# Patient Record
Sex: FEMALE | Employment: OTHER | URBAN - METROPOLITAN AREA
[De-identification: names, ages, dates, MRNs, and addresses within clinical notes are randomized per-mention and may not be internally consistent; named-entity substitution may affect disease eponyms.]

---

## 2019-07-10 NOTE — TELEPHONE ENCOUNTER
RECORDS RECEIVED FROM: External - Dr. Tolu Kuo at [facility]    DATE RECEIVED: 7/23/19   NOTES (FOR ALL VISITS) STATUS DETAILS   OFFICE NOTE from referring provider Care Everywhere 6/18/19   OFFICE NOTE from other specialist N/A    DISCHARGE SUMMARY from hospital N/A    DISCHARGE REPORT from the ER N/A    OPERATIVE REPORT N/A    MEDICATION LIST Care Everywhere    IMAGING  (FOR ALL VISITS)     EMG N/A    EEG N/A    ECT N/A    MRI (HEAD, NECK, SPINE) Received  [facility]:  MRI Brain 10/4/11   LUMBAR PUNCTURE N/A    NATAL[facility]A Scan N/A    CT (HEAD, NECK, SPINE) N/A       Action    Action Taken Imaging request faxed to [facility]

## 2019-07-16 NOTE — TELEPHONE ENCOUNTER
Imaging Received  [facility]   Image Type (x): Disc:   PACS: x   Exam Date/Name MRI Brain 10/4/11 Comments: Images resolved in PACS

## 2019-07-22 ASSESSMENT — ENCOUNTER SYMPTOMS
COUGH: 0
SPEECH CHANGE: 0
MUSCLE CRAMPS: 1
BACK PAIN: 0
BREAST PAIN: 0
NERVOUS/ANXIOUS: 1
STIFFNESS: 1
HYPERTENSION: 1
DYSPNEA ON EXERTION: 0
SEIZURES: 0
HYPOTENSION: 0
MEMORY LOSS: 1
POSTURAL DYSPNEA: 0
DEPRESSION: 1
BREAST MASS: 0
TINGLING: 1
NUMBNESS: 1
SHORTNESS OF BREATH: 0
DECREASED CONCENTRATION: 1
HEADACHES: 1
SPUTUM PRODUCTION: 0
MUSCLE WEAKNESS: 1
WHEEZING: 0
INSOMNIA: 1
SLEEP DISTURBANCES DUE TO BREATHING: 0
COUGH DISTURBING SLEEP: 0
MYALGIAS: 1
ORTHOPNEA: 0
HEMOPTYSIS: 0
EXERCISE INTOLERANCE: 0
LOSS OF CONSCIOUSNESS: 0
LEG PAIN: 1
DISTURBANCES IN COORDINATION: 1
PANIC: 0
SNORES LOUDLY: 1
ARTHRALGIAS: 1
LIGHT-HEADEDNESS: 0
WEAKNESS: 1
TREMORS: 1
PALPITATIONS: 0
SYNCOPE: 0
PARALYSIS: 0
JOINT SWELLING: 1
DIZZINESS: 1

## 2019-07-23 ENCOUNTER — OFFICE VISIT (OUTPATIENT)
Dept: NEUROLOGY | Facility: CLINIC | Age: 74
End: 2019-07-23
Payer: MEDICARE

## 2019-07-23 ENCOUNTER — PRE VISIT (OUTPATIENT)
Dept: NEUROLOGY | Facility: CLINIC | Age: 74
End: 2019-07-23

## 2019-07-23 VITALS — DIASTOLIC BLOOD PRESSURE: 72 MMHG | OXYGEN SATURATION: 100 % | SYSTOLIC BLOOD PRESSURE: 149 MMHG | HEART RATE: 82 BPM

## 2019-07-23 DIAGNOSIS — G20.C PARKINSONISM, UNSPECIFIED PARKINSONISM TYPE (H): Primary | ICD-10-CM

## 2019-07-23 RX ORDER — LOPERAMIDE HYDROCHLORIDE 2 MG/1
2 TABLET ORAL PRN
COMMUNITY
End: 2020-05-22

## 2019-07-23 RX ORDER — HYDROCHLOROTHIAZIDE 25 MG/1
TABLET ORAL
COMMUNITY
Start: 2018-07-20 | End: 2020-05-22

## 2019-07-23 RX ORDER — BUDESONIDE 3 MG/1
CAPSULE, COATED PELLETS ORAL
COMMUNITY
Start: 2018-12-28 | End: 2020-05-22

## 2019-07-23 RX ORDER — LETROZOLE 2.5 MG/1
TABLET, FILM COATED ORAL
COMMUNITY
Start: 2018-10-01 | End: 2020-05-22

## 2019-07-23 RX ORDER — CHOLECALCIFEROL (VITAMIN D3) 25 MCG
CAPSULE ORAL
COMMUNITY
End: 2020-05-22

## 2019-07-23 RX ORDER — LISINOPRIL 40 MG/1
TABLET ORAL
COMMUNITY
Start: 2018-07-20 | End: 2020-05-22

## 2019-07-23 RX ORDER — ARIPIPRAZOLE 20 MG/1
20 TABLET ORAL DAILY
COMMUNITY
Start: 2018-07-20 | End: 2020-05-22

## 2019-07-23 RX ORDER — BISMUTH SUBSALICYLATE 262 MG/1
2 TABLET, CHEWABLE ORAL
COMMUNITY
Start: 2013-07-09 | End: 2020-05-22

## 2019-07-23 SDOH — HEALTH STABILITY: MENTAL HEALTH: HOW OFTEN DO YOU HAVE A DRINK CONTAINING ALCOHOL?: 4 OR MORE TIMES A WEEK

## 2019-07-23 SDOH — HEALTH STABILITY: MENTAL HEALTH: HOW MANY STANDARD DRINKS CONTAINING ALCOHOL DO YOU HAVE ON A TYPICAL DAY?: 1 OR 2

## 2019-07-23 ASSESSMENT — PAIN SCALES - GENERAL: PAINLEVEL: NO PAIN (0)

## 2019-07-23 ASSESSMENT — UNIFIED PARKINSONS DISEASE RATING SCALE (UPDRS)
POSTURAL_STABILITY: SEVERE: VERY UNSTABLE, TENDS TO LOSE BALANCE SPONTANEOUSLY OR WITH JUST A GENTLE PULL ON THE SHOULDERS.
AMPLITUDE_LIP_JAW: NORMAL: NO TREMOR.
SPEECH: SLIGHT: LOSS OF MODULATION, DICTION OR VOLUME, BUT STILL ALL WORDS EASY TO UNDERSTAND.
RIGIDITY_LUE: SLIGHT: RIGIDITY ONLY DETECTED WITH ACTIVATION MANEUVER.
TOTAL_SCORE: 3
LEG_AGILITY_RIGHT: NORMAL
TOETAPPING_RIGHT: NORMAL
FINGER_TAPPING_LEFT: SLIGHT: ANY OF THE FOLLOWING: A) THE REGULAR RHYTHM IS BROKEN WITH ONE WITH ONE OR TWO INTERRUPTIONS OR HESITATIONS OF THE MOVEMENT B) SLIGHT SLOWING C) THE AMPLITUDE DECREMENTS NEAR THE END OF THE 10 MOVEMENTS.
HANDMOVEMENTS_LEFT: SLIGHT: ANY OF THE FOLLOWING: A) THE REGULAR RHYTHM IS BROKEN WITH ONE WITH ONE OR TWO INTERRUPTIONS OR HESITATIONS OF THE MOVEMENT B) SLIGHT SLOWING C) THE AMPLITUDE DECREMENTS NEAR THE END OF THE 10 MOVEMENTS.
GAIT: MILD: INDEPENDENT WALKING BUT WITH SUBSTANTIAL GAIT IMPAIRMENT.
AMPLITUDE_RUE: NORMAL: NO TREMOR.
HANDMOVEMENTS_RIGHT: SLIGHT: ANY OF THE FOLLOWING: A) THE REGULAR RHYTHM IS BROKEN WITH ONE WITH ONE OR TWO INTERRUPTIONS OR HESITATIONS OF THE MOVEMENT B) SLIGHT SLOWING C) THE AMPLITUDE DECREMENTS NEAR THE END OF THE 10 MOVEMENTS.
RIGIDITY_RLE: NORMAL
PRONATION_SUPINATION_LEFT: NORMAL
TOETAPPING_LEFT: NORMAL
RIGIDITY_NECK: NORMAL
ARISING_CHAIR: MILD:  PUSHES SELF UP FROM ARMS OF CHAIR WITHOUT DIFFICULTY.
SPONTANEITY_OF_MOVEMENT: 0: NORMAL.  NO PROBLEMS.
LEG_AGILITY_LEFT: NORMAL
AXIAL_SCORE: 13
FREEZING_GAIT: NORMAL
AMPLITUDE_LLE: NORMAL: NO TREMOR.
TOTAL_SCORE: 20
TOTAL_SCORE_LEFT: 4
RIGIDITY_RUE: NORMAL
PARKINSONS_MEDS: OFF
AMPLITUDE_RLE: NORMAL: NO TREMOR.
RIGIDITY_LLE: NORMAL
AMPLITUDE_LUE: NORMAL: NO TREMOR.
POSTURE: 2 MILD.  DEFINITE FLEXION, SCOLIOSIS OR LEANING OT ONE SIDE, BUT CAN CORRECT POSTURE TO NORMAL WHEN ASKED.
FACIAL_EXPRESSION: MILD: IN ADDITION TO DECREASED EYE-BLINK FREQUENCY, MASKED FACIES PRESENT IN THE LOWER FACE AS WELL, NAMELY FEWER MOVEMENTS AROUND THE MOUTH, SUCH AS LESS SPONTANEOUS SMILING, BUT LIPS NOT PARTED.
PRONATION_SUPINATION_RIGHT: NORMAL
FINGER_TAPPING_RIGHT: SLIGHT: ANY OF THE FOLLOWING: A) THE REGULAR RHYTHM IS BROKEN WITH ONE WITH ONE OR TWO INTERRUPTIONS OR HESITATIONS OF THE MOVEMENT B) SLIGHT SLOWING C) THE AMPLITUDE DECREMENTS NEAR THE END OF THE 10 MOVEMENTS.
CONSTANCY_TREMOR_ATREST: NORMAL: NO TREMOR.

## 2019-07-23 NOTE — PATIENT INSTRUCTIONS
1.  You have Parkinsonism.  It could be Parkinson's disease or drug-induced Parkinsonism.  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  2.  I would recommend you taper off Abilify under your doctor or a psychiatrists observation.  3.  Please come back and see me when you return to [place].

## 2019-07-23 NOTE — LETTER
2019       RE: Torri Cuevas  [address]  [facility] [address]     Dear Colleague,    Thank you for referring your patient, Torri Cuevas, to the [facility] NEUROLOGY at [facility]. Please see a copy of my visit note below.    Department of Neurology  Movement Disorders Division   Initial Clinic Evaluation     Patient: Torri Cuevas   MRN: [id number]   : 1945   Date of Visit: 2019     Referring Physician: Ayaan    Reason for Referral: ? Parkinson's disease    Impression:  1.  Parkinsonism.  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  I agree with Dr. Don that the patient's tremor sounds more like essential tremor than parkinsonian tremor.  A DaTscan could be done but current studies indicate only 90% accuracy at best.  I think it makes more sense to taper off Abilify under her family doctor or psychiatrist care.  Most patients improve within 3 months although the rare patient will continue to have symptoms for 6 to 12 months.    Recommendations:  1.  Patient and her  feel that they will contact the psychiatrist in [place] and taper Abilify under surveillance.  2.  They will keep me apprised of her progress through [facility].  3.  She will return in the spring when they are coming back from [place] for reexamination.  If she continues to have evidence of parkinsonism on exam at that time I think a DaTscan or trial of levodopa would make sense.    Thanks to Dr. Kuo for involving me in this patient's care.      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  She raised her family in [place].  She recently retired to [employer] and [employer] in [place].  She says her  has noted for at least 2 years that she shakes in the hands and shuffles when she walks.  Her 's father had  Parkinson's disease and he thinks she walks like his father.  He feels she is slowing down.  Recently the patient's friends who have Parkinson's disease or friends with Parkinson's disease encouraged her to see a doctor because of her reduced arm swing.    The patient feels she has had some tremor for 10 years.  This is been in her hands.  It was an action tremor.  She noticed it when she would pick things up.  She notices it when she progresses with yoga.  It does not affect her eating drinking or handwriting.  There is no family history of tremor.  She has never developed resting tremor.    She has been on Abilify for at least 10 years.  She is taking 20 mg a day.  She says it was given to her for depression.  She says it never worked very well.  The person who gave it to her retired.  She simply continued on it.    For the past year her arms do not swing when she walks.  She shuffles and walks slowly.  She is weaker than she was in the past.  There is no family history of Parkinson's disease or tremor.  She fell 2 years ago and hit her head.  There was no period of unconsciousness.  She was seen in the emergency room and released.  She has no history of toxic exposures.    She has not lost her sense of smell.  She has no REM behavior disorder.  She has no restless leg syndrome.  Drooling has been increased.  There is no micrographia.  Swallowing and speech are normal.  She has a little bit of difficulty getting out of a chair and turning in bed.  She has no difficulty dressing.    She has had no falls or freezing.  She has no festination.  She has a bit of bladder frequency without incontinence.  She has had one episode of hallucination where she thought the leak was on fire.  She has no other form hallucination of people or animals.  Her memory is worse than it was in the past but her  said his is not out of the ordinary for her age.    The patient saw Dr. Kuo on June 18, 2019.  I read through his  excellent consultation.  He found evidence of parkinsonism with cogwheel rigidity in the left biceps reduced stride length, reduced arm swing, and retropulsion.  He wondered about doing a DaTscan.  He recognizes that this could be drug-induced parkinsonism due to neuroleptic therapy.    Past Medical History:   No past medical history on file.    Past Surgical History:   No past surgical history on file.    Medications:  Current Outpatient Medications   Medication Sig Dispense Refill     ARIPiprazole (ABILIFY) 20 MG tablet Take 20 mg by mouth daily       bismuth subsalicylate (PEPTO-BISMOL) 262 MG chewable tablet 2 tablets       budesonide (ENTOCORT EC) 3 MG EC capsule Start at 3 tabs daily x 6 weeks then 2 tabs x x 2 weeks then 1 tab x 2 weeks       calcium carbonate-vitamin D (OSCAL 500/200 D-3) 500-200 MG-UNIT tablet Take 1 tablet by mouth 2 times daily       Cholecalciferol (VITAMIN D-3) 1000 units CAPS        FLUoxetine (PROZAC) 20 MG capsule TAKE 2 CAPSULES ONCE DAILY       hydrochlorothiazide (HYDRODIURIL) 25 MG tablet TAKE 1 TABLET DAILY       letrozole (FEMARA) 2.5 MG tablet TAKE 1 TABLET DAILY       lisinopril (PRINIVIL/ZESTRIL) 40 MG tablet TAKE 1 TABLET DAILY       loperamide (IMODIUM A-D) 2 MG tablet Take 2 mg by mouth as needed for diarrhea       Multiple Vitamins-Minerals (MULTIVITAMIN PO) Take 1 tablet by mouth daily       Zoledronic Acid (ZOMETA IV)             Movement Disorder-related Medications                                                                                                                                                             Allergies: is allergic to lactose.    Social History:   Social History     Socioeconomic History     Marital status:      Spouse name: None     Number of children: None     Years of education: None     Highest education level: None   Occupational History     None   Social Needs     Financial resource strain: None     Food insecurity:     Worry:  None     Inability: None     Transportation needs:     Medical: None     Non-medical: None   Tobacco Use     Smoking status: Never Smoker     Smokeless tobacco: Never Used   Substance and Sexual Activity     Alcohol use: Yes     Frequency: 4 or more times a week     Drinks per session: 1 or 2     Drug use: None     Sexual activity: None   Lifestyle     Physical activity:     Days per week: None     Minutes per session: None     Stress: None   Relationships     Social connections:     Talks on phone: None     Gets together: None     Attends Religion service: None     Active member of club or organization: None     Attends meetings of clubs or organizations: None     Relationship status: None     Intimate partner violence:     Fear of current or ex partner: None     Emotionally abused: None     Physically abused: None     Forced sexual activity: None   Other Topics Concern     None   Social History Narrative     None       Family History:  No family history on file.    ROS:      Neurologic  Visual loss: no, Double vision: YES, Headache: YES, Loss of consciousness:  no, Seizure: no, Fainting (syncope): no, Dysarthria: no, Dysphagia:  no, Vertigo:  no, Weakness: no, Atrophy: no, Twitches: no, Lhermitte's: no, Numbness or tingling: YES, Handwriting change: no, Tremors: YES, Involuntary movements: no, Imbalance: no, Abnormal gait:  no, Falls :no, Memory loss: YES, RBD: no, Sleep disorder: no, Hallucination: no, Loss of concentration: no,  Behavioral change: no,  Loss of motivation: no      Comprehensive Neurologic Exam    Mental Status Exam   The patient is alert, oriented and exhibits no difficulty with cognition or memory.  A formal short test of mental status was performed.     Neurovascular         Speech and Language   Right Left     Carotid Bruit Absent Absent  Speech is normal.   Dorsalis Pedis Pulse Normal Normal  Description   Posterior Tibial Pulse Normal Normal  Language is normal.     Cranial Nerve Exam                  Right Left   Right Left   II                                        Normal Normal  Hearing (VIII) Normal Normal      III, IV, V!                   Normal Normal  Nystagmus (VIII) Normal Normal   EOM description                              Gag (IX, X) Normal Normal   Facial Sensation (V) Normal Normal  SCM (XI) Normal Normal   Muscles of Mastication (V) Normal Normal  Trapezius (XI) Normal Normal   Facial Strength (VII) Normal Normal  Tongue (XII) Normal Normal     Motor Exam  Strength (*/5 grading)  Muscle                  Right Left  Muscle Right Left   Frontalis                                           Normal Normal  Iliopsoas Normal    Normal          Orbicularis Oculi                     Normal Normal  Quadrideps Normal    Normal        Orbicularis Oris                         Normal Normal  Anterior Tibial Normal Normal      Deltoid Normal Normal  Gastrocnemius Normal    Normal   Biceps Normal Normal  Extensor Hallucis Longus Normal    Normal   Triceps Normal Normal  Toe Extensors Normal    Normal   EDC Normal Normal  Toe Flexor Normal    Normal   Finger Flexors Normal Normal  Other Normal Normal   First Dorsal Interosseous Normal Normal  Other Normal Normal   Hypothenar Normal Normal  Other Normal Normal   Thenar Normal Normal  Other Normal Normal     Reflexes      Tone   Right Left   Right Left   Biceps Normal Normal  Spasticity (S)  Rigidity (R)     Triceps Normal Normal  Neck     Brachioradialis Normal Normal  Arm     Quadriceps Normal Normal  Leg     Ankle Normal Normal       Babkinski Flexor Flexor         UPDRS Values 7/23/2019   Time: 1:00 PM   Medication Off   R Brain DBS: None   L Brain DBS: None   Speech 1   Facial Expression 2   Rigidity Neck 0   Rigidity RUE 0   Rigidity LUE 1   Rigidity RLE 0   Rigidity LLE 0   Finger Taps R 1   Finger Taps L 1   Hand Mvt R 1   Hand Mvt L 1   Pron-/Supinate R 0   Pron-/Supinate L 0   Toe Tap R 0   Toe Tap L 0   Leg Agility R 0   Leg Agility L 0   Arise  From Chair 2   Gait 2   Gait Freezing 0   Postural Stability 4   Posture 2   Global Spont Mvt 0   Postural Tremor RUE 1   Postural Tremor LUE 1   Kinetic Tremor RUE 0   Kinetic Tremor LUE 0   Rest Tremor RUE 0   Rest Tremor LUE 0   Rest Tremor RLE 0   Rest Tremor LLE 0   Rest Tremor Lip/Jaw 0   Rest Tremor Constancy 0   Total Right 3   Total Left 4   Axial Total 13   Total 20       Sensory Exam          Right Left    Pin Normal Normal    Vibration Normal Normal    Joint position Normal Normal    Other           Coding statement:     Medical decision making is high due to the following components:    Number of diagnoses:[id number]  Management:Diagnostic tests orders or reviewed.Yes  Medications were prescribed.No Medications were adjusted.No  Complexity of medical data:Outside records reviewed.Yes Radiology images reviewed by myself.No Review/order clinical lab tests. No Review/order radiologyNo Discussed tests with performing physician. No Called outside records.Yes Discussed patient with another provider.No Took collateral history.YesRiskOne or more chronic illnesses with severe exacerbation, progression, or side effects of treatment.YesAcute or chronic illness or injury that poses a threat to life or bodily function.No  Abrupt change in neurologic status.No    Again, thank you for allowing me to participate in the care of your patient.      Sincerely,    Brian Layton MD

## 2019-07-23 NOTE — PROGRESS NOTES
Department of Neurology  Movement Disorders Division   Initial Clinic Evaluation     Patient: Torri Cuevas   MRN: [id number]   : 1945   Date of Visit: 2019     Referring Physician: Ayaan    Reason for Referral: ? Parkinson's disease    Impression:  1.  Parkinsonism.  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  I agree with Dr. Don that the patient's tremor sounds more like essential tremor than parkinsonian tremor.  A DaTscan could be done but current studies indicate only 90% accuracy at best.  I think it makes more sense to taper off Abilify under her family doctor or psychiatrist care.  Most patients improve within 3 months although the rare patient will continue to have symptoms for 6 to 12 months.    Recommendations:  1.  Patient and her  feel that they will contact the psychiatrist in [place] and taper Abilify under surveillance.  2.  They will keep me apprised of her progress through [facility].  3.  She will return in the spring when they are coming back from [place] for reexamination.  If she continues to have evidence of parkinsonism on exam at that time I think a DaTscan or trial of levodopa would make sense.    Thanks to Dr. Kuo for involving me in this patient's care.      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  She raised her family in [place].  She recently retired to [employer] and [employer] in [place].  She says her  has noted for at least 2 years that she shakes in the hands and shuffles when she walks.  Her 's father had Parkinson's disease and he thinks she walks like his father.  He feels she is slowing down.  Recently the patient's friends who have Parkinson's disease or friends with Parkinson's  disease encouraged her to see a doctor because of her reduced arm swing.    The patient feels she has had some tremor for 10 years.  This is been in her hands.  It was an action tremor.  She noticed it when she would pick things up.  She notices it when she progresses with yoga.  It does not affect her eating drinking or handwriting.  There is no family history of tremor.  She has never developed resting tremor.    She has been on Abilify for at least 10 years.  She is taking 20 mg a day.  She says it was given to her for depression.  She says it never worked very well.  The person who gave it to her retired.  She simply continued on it.    For the past year her arms do not swing when she walks.  She shuffles and walks slowly.  She is weaker than she was in the past.  There is no family history of Parkinson's disease or tremor.  She fell 2 years ago and hit her head.  There was no period of unconsciousness.  She was seen in the emergency room and released.  She has no history of toxic exposures.    She has not lost her sense of smell.  She has no REM behavior disorder.  She has no restless leg syndrome.  Drooling has been increased.  There is no micrographia.  Swallowing and speech are normal.  She has a little bit of difficulty getting out of a chair and turning in bed.  She has no difficulty dressing.    She has had no falls or freezing.  She has no festination.  She has a bit of bladder frequency without incontinence.  She has had one episode of hallucination where she thought the leak was on fire.  She has no other form hallucination of people or animals.  Her memory is worse than it was in the past but her  said his is not out of the ordinary for her age.    The patient saw Dr. Kuo on June 18, 2019.  I read through his excellent consultation.  He found evidence of parkinsonism with cogwheel rigidity in the left biceps reduced stride length, reduced arm swing, and retropulsion.  He wondered about  doing a DaTscan.  He recognizes that this could be drug-induced parkinsonism due to neuroleptic therapy.        Past Medical History:   No past medical history on file.    Past Surgical History:   No past surgical history on file.    Medications:  Current Outpatient Medications   Medication Sig Dispense Refill     ARIPiprazole (ABILIFY) 20 MG tablet Take 20 mg by mouth daily       bismuth subsalicylate (PEPTO-BISMOL) 262 MG chewable tablet 2 tablets       budesonide (ENTOCORT EC) 3 MG EC capsule Start at 3 tabs daily x 6 weeks then 2 tabs x x 2 weeks then 1 tab x 2 weeks       calcium carbonate-vitamin D (OSCAL 500/200 D-3) 500-200 MG-UNIT tablet Take 1 tablet by mouth 2 times daily       Cholecalciferol (VITAMIN D-3) 1000 units CAPS        FLUoxetine (PROZAC) 20 MG capsule TAKE 2 CAPSULES ONCE DAILY       hydrochlorothiazide (HYDRODIURIL) 25 MG tablet TAKE 1 TABLET DAILY       letrozole (FEMARA) 2.5 MG tablet TAKE 1 TABLET DAILY       lisinopril (PRINIVIL/ZESTRIL) 40 MG tablet TAKE 1 TABLET DAILY       loperamide (IMODIUM A-D) 2 MG tablet Take 2 mg by mouth as needed for diarrhea       Multiple Vitamins-Minerals (MULTIVITAMIN PO) Take 1 tablet by mouth daily       Zoledronic Acid (ZOMETA IV)             Movement Disorder-related Medications                                                                                                                                                             Allergies: is allergic to lactose.    Social History:   Social History     Socioeconomic History     Marital status:      Spouse name: None     Number of children: None     Years of education: None     Highest education level: None   Occupational History     None   Social Needs     Financial resource strain: None     Food insecurity:     Worry: None     Inability: None     Transportation needs:     Medical: None     Non-medical: None   Tobacco Use     Smoking status: Never Smoker     Smokeless tobacco: Never Used    Substance and Sexual Activity     Alcohol use: Yes     Frequency: 4 or more times a week     Drinks per session: 1 or 2     Drug use: None     Sexual activity: None   Lifestyle     Physical activity:     Days per week: None     Minutes per session: None     Stress: None   Relationships     Social connections:     Talks on phone: None     Gets together: None     Attends [employer] service: None     Active member of club or organization: None     Attends meetings of clubs or organizations: None     Relationship status: None     Intimate partner violence:     Fear of current or ex partner: None     Emotionally abused: None     Physically abused: None     Forced sexual activity: None   Other Topics Concern     None   Social History Narrative     None       Family History:  No family history on file.    ROS:      Neurologic  Visual loss: no, Double vision: YES, Headache: YES, Loss of consciousness:  no, Seizure: no, Fainting (syncope): no, Dysarthria: no, Dysphagia:  no, Vertigo:  no, Weakness: no, Atrophy: no, Twitches: no, Lhermitte's: no, Numbness or tingling: YES, Handwriting change: no, Tremors: YES, Involuntary movements: no, Imbalance: no, Abnormal gait:  no, Falls :no, Memory loss: YES, RBD: no, Sleep disorder: no, Hallucination: no, Loss of concentration: no,  Behavioral change: no,  Loss of motivation: no      Comprehensive Neurologic Exam    Mental Status Exam   The patient is alert, oriented and exhibits no difficulty with cognition or memory.  A formal short test of mental status was performed.     Neurovascular         Speech and Language   Right Left     Carotid Bruit Absent Absent  Speech is normal.   Dorsalis Pedis Pulse Normal Normal  Description   Posterior Tibial Pulse Normal Normal  Language is normal.     Cranial Nerve Exam                 Right Left   Right Left   II                                        Normal Normal  Hearing (VIII) Normal Normal      III, IV, V!                   Normal Normal   Nystagmus (VIII) Normal Normal   EOM description                              Gag (IX, X) Normal Normal   Facial Sensation (V) Normal Normal  SCM (XI) Normal Normal   Muscles of Mastication (V) Normal Normal  Trapezius (XI) Normal Normal   Facial Strength (VII) Normal Normal  Tongue (XII) Normal Normal     Motor Exam  Strength (*/5 grading)  Muscle                  Right Left  Muscle Right Left   Frontalis                                           Normal Normal  Iliopsoas Normal    Normal          Orbicularis Oculi                     Normal Normal  Quadrideps Normal    Normal        Orbicularis Oris                         Normal Normal  Anterior Tibial Normal Normal      Deltoid Normal Normal  Gastrocnemius Normal    Normal   Biceps Normal Normal  Extensor Hallucis Longus Normal    Normal   Triceps Normal Normal  Toe Extensors Normal    Normal   EDC Normal Normal  Toe Flexor Normal    Normal   Finger Flexors Normal Normal  Other Normal Normal   First Dorsal Interosseous Normal Normal  Other Normal Normal   Hypothenar Normal Normal  Other Normal Normal   Thenar Normal Normal  Other Normal Normal     Reflexes      Tone   Right Left   Right Left   Biceps Normal Normal  Spasticity (S)  Rigidity (R)     Triceps Normal Normal  Neck     Brachioradialis Normal Normal  Arm     Quadriceps Normal Normal  Leg     Ankle Normal Normal       Babkinski Flexor Flexor         UPDRS Values 7/23/2019   Time: 1:00 PM   Medication Off   R Brain DBS: None   L Brain DBS: None   Speech 1   Facial Expression 2   Rigidity Neck 0   Rigidity RUE 0   Rigidity LUE 1   Rigidity RLE 0   Rigidity LLE 0   Finger Taps R 1   Finger Taps L 1   Hand Mvt R 1   Hand Mvt L 1   Pron-/Supinate R 0   Pron-/Supinate L 0   Toe Tap R 0   Toe Tap L 0   Leg Agility R 0   Leg Agility L 0   Arise From Chair 2   Gait 2   Gait Freezing 0   Postural Stability 4   Posture 2   Global Spont Mvt 0   Postural Tremor RUE 1   Postural Tremor LUE 1   Kinetic Tremor RUE 0    Kinetic Tremor LUE 0   Rest Tremor RUE 0   Rest Tremor LUE 0   Rest Tremor RLE 0   Rest Tremor LLE 0   Rest Tremor Lip/Jaw 0   Rest Tremor Constancy 0   Total Right 3   Total Left 4   Axial Total 13   Total 20         Sensory Exam          Right Left    Pin Normal Normal    Vibration Normal Normal    Joint position Normal Normal    Other             Coding statement:     Medical decision making is high due to the following components:    Number of diagnoses:[id number]  Management:Diagnostic tests orders or reviewed.Yes  Medications were prescribed.No Medications were adjusted.No  Complexity of medical data:Outside records reviewed.Yes Radiology images reviewed by myself.No Review/order clinical lab tests. No Review/order radiologyNo Discussed tests with performing physician. No Called outside records.Yes Discussed patient with another provider.No Took collateral history.YesRiskOne or more chronic illnesses with severe exacerbation, progression, or side effects of treatment.YesAcute or chronic illness or injury that poses a threat to life or bodily function.No  Abrupt change in neurologic status.No                     Answers for HPI/ROS submitted by the patient on 7/22/2019   General Symptoms: No  Skin Symptoms: No  HENT Symptoms: No  EYE SYMPTOMS: No  HEART SYMPTOMS: Yes  LUNG SYMPTOMS: Yes  INTESTINAL SYMPTOMS: No  URINARY SYMPTOMS: No  GYNECOLOGIC SYMPTOMS: No  BREAST SYMPTOMS: Yes  SKELETAL SYMPTOMS: Yes  BLOOD SYMPTOMS: No  NERVOUS SYSTEM SYMPTOMS: Yes  MENTAL HEALTH SYMPTOMS: Yes  Cough: No  Sputum or phlegm: No  Coughing up blood: No  Difficulty breating or shortness of breath: No  Snoring: Yes  Wheezing: No  Difficulty breathing on exertion: No  Nighttime Cough: No  Difficulty breathing when lying flat: No  Chest pain or pressure: No  Fast or irregular heartbeat: No  Pain in legs with walking: Yes  Trouble breathing while lying down: No  Fingers or toes appear blue: No  High blood pressure: Yes  Low  blood pressure: No  Fainting: No  Murmurs: No  Pacemaker: No  Varicose veins: No  Edema or swelling: Yes  Wake up at night with shortness of breath: No  Light-headedness: No  Exercise intolerance: No  Back pain: No  Muscle aches: Yes  Swollen joints: Yes  Joint pain: Yes  Bone pain: Yes  Muscle cramps: Yes  Muscle weakness: Yes  Joint stiffness: Yes  Bone fracture: No  Trouble with coordination: Yes  Dizziness or trouble with balance: Yes  Fainting or black-out spells: No  Memory loss: Yes  Headache: Yes  Seizures: No  Speech problems: No  Tingling: Yes  Tremor: Yes  Weakness: Yes  Difficulty walking: Yes  Paralysis: No  Numbness: Yes  Discharge: No  Lumps: No  Pain: No  Nipple retraction: No  Nervous or Anxious: Yes  Depression: Yes  Trouble sleeping: Yes  Trouble thinking or concentrating: Yes  Mood changes: No  Panic attacks: No

## 2020-01-22 ENCOUNTER — TRANSFERRED RECORDS (OUTPATIENT)
Dept: HEALTH INFORMATION MANAGEMENT | Facility: CLINIC | Age: 75
End: 2020-01-22

## 2020-02-04 ENCOUNTER — TRANSFERRED RECORDS (OUTPATIENT)
Dept: HEALTH INFORMATION MANAGEMENT | Facility: CLINIC | Age: 75
End: 2020-02-04

## 2020-03-11 ENCOUNTER — HEALTH MAINTENANCE LETTER (OUTPATIENT)
Age: 75
End: 2020-03-11

## 2020-03-20 ENCOUNTER — HOSPITAL ENCOUNTER (OUTPATIENT)
Dept: HOSPITAL 90 - RAH | Age: 75
Discharge: HOME | End: 2020-03-20
Attending: INTERNAL MEDICINE
Payer: MEDICARE

## 2020-03-20 DIAGNOSIS — R60.0: Primary | ICD-10-CM

## 2020-03-20 PROCEDURE — 93970 EXTREMITY STUDY: CPT

## 2020-05-15 PROBLEM — G21.19 DRUG-INDUCED PARKINSONISM (H): Status: ACTIVE | Noted: 2020-05-15

## 2020-05-15 PROBLEM — Z17.0 MALIGNANT NEOPLASM OF UPPER-OUTER QUADRANT OF RIGHT BREAST IN FEMALE, ESTROGEN RECEPTOR POSITIVE (H): Status: ACTIVE | Noted: 2018-08-22

## 2020-05-15 PROBLEM — C50.411 MALIGNANT NEOPLASM OF UPPER-OUTER QUADRANT OF RIGHT BREAST IN FEMALE, ESTROGEN RECEPTOR POSITIVE (H): Status: ACTIVE | Noted: 2018-08-22

## 2020-05-15 RX ORDER — LISINOPRIL/HYDROCHLOROTHIAZIDE 10-12.5 MG
1 TABLET ORAL
COMMUNITY
Start: 2020-04-11 | End: 2020-05-22

## 2020-05-15 RX ORDER — TAMOXIFEN CITRATE 20 MG/1
20 TABLET ORAL
COMMUNITY
Start: 2019-08-14 | End: 2020-05-22

## 2020-05-15 RX ORDER — POTASSIUM CHLORIDE 750 MG/1
10 TABLET, EXTENDED RELEASE ORAL
COMMUNITY
Start: 2020-05-11 | End: 2020-05-22

## 2020-05-15 RX ORDER — BUDESONIDE 3 MG/1
CAPSULE, COATED PELLETS ORAL
COMMUNITY
Start: 2019-10-22 | End: 2020-07-15

## 2020-05-15 NOTE — PROGRESS NOTES
"      VIDEO VISIT    Date of Visit: May 22, 2020  Name: Torri Cuevas \"Torri\"  Kevin  Date of Birth 1945  [facility] [address]  [phone] (M)  [email or web address]  No proxy    [email or web address]    Kevin Cuevas  Same number    Assessment:  (G20) Parkinsonism, unspecified Parkinsonism type (H)  (primary encounter diagnosis) - presumed medication induced.     She has features of tardive dyskinesias that have present since feb 2020.    Tremors resolved when she tapered off abilify summer 2019 after seeing Dr. Layton 7/2019    Seen by Dr. Layton and Dr. Kuo    Has not seen psychiatry     Dr. Amol [place]  3/2020 visit  Sertraline was started for mood and her mood is better.       08/14/2019 Appointment Oncology Jessica Steven, APRN, CNP    [address], [place] [address] [phone] (Fax)             Medications            Aripiprazole abilify 20mg Off        Bismuth-subsalicylate pepto-bismol 262mtn ?taking       Budesonide entocort ec 3mg  Taper schedule       Calcium citrate vitamin D 315-250 1  1     Lisinopril-hydrochlorothiazide zestoretic 10-12.5mg  1       Loperamide imodium 2mg  4/day as needed       MVI 1       Potassium chloride ER Ktab klorcon 10meq cr 1  1     Sertaline zoloft 100mg   1     Tamoxifen novadex 20mg  Off for 2 weeks       Topiramate topamax 25mg   3     Vitamin d3 2000 international unit(s)  1       Zoledronic acid zometa Every 6 months                                                                                                         Plan:    Psychiatric disorder  Medication induced tremor/parkinsonism better off abilify  Now having features of tardive dyskinesias   Would benefit from ongoing psychiatric care  Consultation with Cara House, Pharm D to review medication options for TD  May need another ECG - had one in [place] and they may be able to send a PDF of it. Otherwise may need to get one done in " [place].    Here are some medication options for TD.     Ingrezza  (Valbenazine)  Ingrezza dosed 40mg daily for 1 week and then can increase to 80mg daily. 1 in 4 patients treated for 6 weeks on 80mg of Ingrezza have severity of symptoms reduced by >50%. Brief review of studies showed this data is from patients with TD due to exposure to neuroleptics. Side effects: somnolence, increased QT interval. Patient is not on any other medications that would contribute to prolonged QT interval. This is a specialty medication and likely would have to be filled through a specialty pharmacy. Cost ~$5000-10,000/month. [facility] could help us navigate coverage for the patient.      Austedo  (deutetrabenazine) dosed 6mg daily and can be increased in 6mg increments up to 48mg/day. 50% of patients reported improvement in their symptoms and 40% of providers observed improvement in their patients. This data is from patients with chorea in Posey's Disease. There is an increased risk of suicidal idea and depression with this medication but patient denies any mood disorder. Side effects include sleepiness, diarrhea, tiredness, dry mouth, prolonged QT interval. This medication costs ~$4000 for #60 of the 6mg capsules.    The starting dose is 6 mg once daily. Titrate up at weekly intervals by 6 mg per day to a tolerated dose that reduces chorea, up to a maximum recommended daily dosage of 48 mg (24 mg twice daily)  Administer with food    6mg/d x 1wk; 6mg 2/day x 1wk; 6mg in am and 12mg in pm x 1wk; then 12mg 2/day then 12mg in am and 18mg in pm x 1wk then 18mg 2/day x 1wk then 24mg in am and 18mg in pm x 1 wk then 24mg 2/day      Tardive movement problems    [email or web address]/home/GetGuidelineContent/613  [email or web address]://[email or web address]/Guidelines/home/GetGuidelineContent/614    Gingko 80mg 3 times per day. Monitor for affects on anticoagulation and antidepressants    Clonazepam  "option          AIMS    [email or web address]_aims.pdf    Facial and Oral movements.   1. Muscle of facial expression   1  2. Lips and perioral area 2  3. Jaw 2  4. Tongue 2    Extremity movements  5. Upper 1  6. Lower 1    Trunk  Neck/shoulder/hips 0    Global judgments  8. Severity of movements 2  9. Incapacitation due to abnormal movements 1  10. Patient awareness of abnormal movements 2    Dental Status  11. Current problems with teeth and/or dentures  0=no;   12. Does patient usually wear dentures  0=no  13. Not edentia = 0   14. Movements are not present at night = 0     AIMS Score 14    I have reviewed the note as documented above.  This accurately captures the substance of my conversation with the patient.  Patient contact time  40 minutes. Over 50% of this visit was spent in patient care and care coordination.     1030am - 1110am    Viraj Ojeda MD      ------------------------------------------------------------------------------------------------------------------------------------------------------------------------    Video-Visit Details    The patient has been notified of following:     \"After a review of the patient s situation, this visit was changed from an in-person visit to a video visit to reduce the risk of COVID-19 exposure.   The patient is being evaluated via a billable video visit.\"    \"This video visit will be conducted via a call between you and your physician/provider. We have found that certain health care needs can be provided without the need for an in-person physical exam.  This service lets us provide the care you need with a video conversation.  If a prescription is necessary we can send it directly to your pharmacy.  If lab work is needed we can place an order for that and you can then stop by our lab to have the test done at a later time.    If during the course of the call the physician/provider feels a video visit is not appropriate, you will not be charged for this " "service.\"     Patient has given verbal consent for Video visit? Yes    Patient would like the video invitation sent by:     Type of service:  Video Visit    Video Start Time:     Video End Time (time video stopped):     Duration:  minutes - see above    Originating Location (pt. Location):     Distant Location (provider location):  [facility] NEUROLOGY     Mode of Communication:  Video Conference via [employer] (and if not possible then doximity)      Viraj Ojeda MD      --------------------------------------------------------------------------------------------------------------    Torri Cuevas is a 74 year old adult who is being evaluated via a billable video visit.      Charts reviewed  Consult from  Images reviewed        I have reviewed and updated the patient's Past Medical History, Social History, Family History and Medication List.    ALLERGIES  Lactose    Lasts visit details if there was a last visit:           14 Review of systems  are negative except for   Patient Active Problem List   Diagnosis     Basal cell carcinoma of face     Excess body and facial hair     HTN (hypertension)     IGT (impaired glucose tolerance)     Major depression in full remission (H)     Malignant neoplasm of upper-outer quadrant of right breast in female, estrogen receptor positive (H)     Mixed hyperlipidemia     Osteoporosis     Schizoaffective disorder (H)        Allergies   Allergen Reactions     Lactose      intol     No past surgical history on file.  No past medical history on file.  Social History     Socioeconomic History     Marital status:      Spouse name: Not on file     Number of children: Not on file     Years of education: Not on file     Highest education level: Not on file   Occupational History     Not on file   Social Needs     Financial resource strain: Not on file     Food insecurity     Worry: Not on file     Inability: Not on file     Transportation needs     Medical: Not on file     " Non-medical: Not on file   Tobacco Use     Smoking status: Never Smoker     Smokeless tobacco: Never Used   Substance and Sexual Activity     Alcohol use: Yes     Frequency: 4 or more times a week     Drinks per session: 1 or 2     Drug use: Not on file     Sexual activity: Not on file   Lifestyle     Physical activity     Days per week: Not on file     Minutes per session: Not on file     Stress: Not on file   Relationships     Social connections     Talks on phone: Not on file     Gets together: Not on file     Attends Christianity service: Not on file     Active member of club or organization: Not on file     Attends meetings of clubs or organizations: Not on file     Relationship status: Not on file     Intimate partner violence     Fear of current or ex partner: Not on file     Emotionally abused: Not on file     Physically abused: Not on file     Forced sexual activity: Not on file   Other Topics Concern     Not on file   Social History Narrative     Not on file     No family history on file.  Current Outpatient Medications   Medication Sig Dispense Refill     ARIPiprazole (ABILIFY) 20 MG tablet Take 20 mg by mouth daily       bismuth subsalicylate (PEPTO-BISMOL) 262 MG chewable tablet 2 tablets       budesonide (ENTOCORT EC) 3 MG EC capsule Start at 3 tabs daily x 6 weeks then 2 tabs x x 2 weeks then 1 tab x 2 weeks       calcium carbonate-vitamin D (OSCAL 500/200 D-3) 500-200 MG-UNIT tablet Take 1 tablet by mouth 2 times daily       Cholecalciferol (VITAMIN D-3) 1000 units CAPS        FLUoxetine (PROZAC) 20 MG capsule TAKE 2 CAPSULES ONCE DAILY       hydrochlorothiazide (HYDRODIURIL) 25 MG tablet TAKE 1 TABLET DAILY       letrozole (FEMARA) 2.5 MG tablet TAKE 1 TABLET DAILY       lisinopril (PRINIVIL/ZESTRIL) 40 MG tablet TAKE 1 TABLET DAILY       loperamide (IMODIUM A-D) 2 MG tablet Take 2 mg by mouth as needed for diarrhea       Multiple Vitamins-Minerals (MULTIVITAMIN PO) Take 1 tablet by mouth daily        Zoledronic Acid (ZOMETA IV)                Surgical History    Surgery Date Site/Laterality Comments   TONSILLECTOMY     as a child     COLONOSCOPY 2012        LEVEL III - SURGICAL PATHOLOGY, GROSS AND MICROSCOPIC EXAMINATION, PRO          OTHER SURGICAL HISTORY 2018 Breast/Right Procedure: RIGHT BREAST LUMPECTOMY WITH NEEDLE LOCALIZATION AND SENTINEL NODE BIOPSY; Surgeon: Grabiel Salamanca MD; Location: [facility] OR     BREAST LUMPECTOMY 2019 Right radiation       Medical History    Medical History Date Comments   Bipolar disorder (HCC) 2011     Depression 2011     Skin cancer of face 2001     History of shingles 2013     Rosacea 2018 [facility] 2018   Headache       Hypertension       Diarrhea         Family History    Medical History Relation Name Comments   Musculo-skeletal Disease Daughter Candy spinal stenosis   Psoriasis Daughter Candy     No Known Problems Daughter Tara     Addiction Daughter Queenie recovering ETOH   Cardiovascular Disease Father       Cancer Maternal Grandmother   stomach -  in her 60's   Breast Cancer Mother   80   Ophthalmic Disease Mother   glaucoma   No Known Problems Sister         Relation Name Status Comments   Daughter Candy Alive     Daughter Tara Alive     Daughter Queenie Alive     Father    (Age 89)     Maternal Grandmother        Mother    (Age 97)     Sister   Alive       Social History    Tobacco Use Types Packs/Day Years Used Date   Never Smoker           Smokeless Tobacco: Never Used           Alcohol Use Drinks/Week oz/Week Comments   Yes     One glass of wine daily.     Sex Assigned at Birth Date Recorded   Female 2018 9:05 AM CDT     Job Start Date Occupation Industry   Not on file Not on file Not on file     Travel History Travel Start Travel End   No recent travel history available.       COVID-19 Exposure Response Date Recorded   In the last month, have you  been in contact with someone who was confirmed or suspected to have Coronavirus / COVID-19? No / Unsure 2020 9:42 AM CDT         ------------------------------------------------------------------------------------------------    Department of Neurology  Movement Disorders Division   Initial Clinic Evaluation      Patient: Torri Cuevas   MRN: [id number]   : 1945   Date of Visit: 2019      Referring Physician: Ayaan     Reason for Referral: ? Parkinson's disease     Impression:  1.  Parkinsonism.  Idiopathic Parkinson's disease versus drug-induced parkinsonism     I think we have to assume that this patient's parkinsonism is drug-induced.  I agree with Dr. Don that the patient's tremor sounds more like essential tremor than parkinsonian tremor.  A DaTscan could be done but current studies indicate only 90% accuracy at best.  I think it makes more sense to taper off Abilify under her family doctor or psychiatrist care.  Most patients improve within 3 months although the rare patient will continue to have symptoms for 6 to 12 months.     Recommendations:  1.  Patient and her  feel that they will contact the psychiatrist in [place] and taper Abilify under surveillance.  2.  They will keep me apprised of her progress through [facility].  3.  She will return in the spring when they are coming back from [place] for reexamination.  If she continues to have evidence of parkinsonism on exam at that time I think a DaTscan or trial of levodopa would make sense.     Thanks to Dr. Kuo for involving me in this patient's care.       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  She raised her family in [place].  She recently retired to [employer] and [employer] in [place].  She says her  has noted for at least 2 years that she shakes in the  hands and shuffles when she walks.  Her 's father had Parkinson's disease and he thinks she walks like his father.  He feels she is slowing down.  Recently the patient's friends who have Parkinson's disease or friends with Parkinson's disease encouraged her to see a doctor because of her reduced arm swing.     The patient feels she has had some tremor for 10 years.  This is been in her hands.  It was an action tremor.  She noticed it when she would pick things up.  She notices it when she progresses with yoga.  It does not affect her eating drinking or handwriting.  There is no family history of tremor.  She has never developed resting tremor.     She has been on Abilify for at least 10 years.  She is taking 20 mg a day.  She says it was given to her for depression.  She says it never worked very well.  The person who gave it to her retired.  She simply continued on it.     For the past year her arms do not swing when she walks.  She shuffles and walks slowly.  She is weaker than she was in the past.  There is no family history of Parkinson's disease or tremor.  She fell 2 years ago and hit her head.  There was no period of unconsciousness.  She was seen in the emergency room and released.  She has no history of toxic exposures.     She has not lost her sense of smell.  She has no REM behavior disorder.  She has no restless leg syndrome.  Drooling has been increased.  There is no micrographia.  Swallowing and speech are normal.  She has a little bit of difficulty getting out of a chair and turning in bed.  She has no difficulty dressing.     She has had no falls or freezing.  She has no festination.  She has a bit of bladder frequency without incontinence.  She has had one episode of hallucination where she thought the leak was on fire.  She has no other form hallucination of people or animals.  Her memory is worse than it was in the past but her  said his is not out of the ordinary for her  "age.     The patient saw Dr. Kuo on June 18, 2019.  I read through his excellent consultation.  He found evidence of parkinsonism with cogwheel rigidity in the left biceps reduced stride length, reduced arm swing, and retropulsion.  He wondered about doing a DaTscan.  He recognizes that this could be drug-induced parkinsonism due to neuroleptic therapy.    -----------------------------------------------------------------------------------------------------------------------    Consult Notes  - documented in this encounter  Tolu Kuo MBBS - 06/18/2019 2:00 PM CDT  Formatting of this note might be different from the original.  CHIEF COMPLAINT:   Chief Complaint   Patient presents with     Tremor   she has had these for \"years\"     HISTORY OF PRESENT ILLNESS:     Thank you for allowing me to see Ms. Cuevas for tremor and gait difficulty. Patient is 73 year old female.    Patient was referred to neurology clinic for concern for Parkinson disease. She is complaining about tremor which has been ongoing for many many years. She has been on Abilify for the last 10 years, and according to her, these tremor started even before starting Abilify. She denies any resting tremor, these tremor are more on action requiring fine motor movements. The concern for Parkinson disease came into the picture after one of her known friend (who has Parkinson) told her that she walks like Parkinson patient. She has noticed that she does not move her arms while walking. She has significant balance difficulties and feels like she will fell off. She has difficulty in walking in a straight line. She had a fall around 1 year ago. Most of the symptoms are going on for last 2 years. Her smell sensation is good, denies constipation, has significant sleeping difficulties, memory has declined. Denies REM sleep behaviors or hallucinations.    Patient has past medical history of major depressive disorder, skin cancer of face, excess body/facial " hair, schizoaffective disorder, right breast cancer. She takes letrozole, Abilify, Prozac, lisinopril, hydrochlorothiazide.    REVIEW OF SYSTEMS:  Positives marked in bold.    Constitutional: fever / chills / sweats / fatigue / weight loss.   Eyes: blurred vision / double vision / visual loss / eye pain / light sensitivity.  ENT: ear pain / ringing in ears / decreased hearing / trouble swallowing.  Cardiovascular: chest pain / palpitations.   Respiratory: coughing / wheezing / shortness of breath.   GI: nausea / vomiting / diarrhea / constipation / abdominal pain.   /GYN: urinary incontinence / urinary retention / urgency / frequency / incomplete emptying / kidney stones.  Musculoskeletal: spine pain / joint pain / muscle cramps.   Skin: rash / itching / dryness.   Psych: depression / anxiety / difficulty sleeping / hallucinations / panic attacks.   Heme/Onc: abnormal bleeding, bruising / Lymphadenopathy / Fevers / Chills.   Neuro: weakness / Numbness / Paresthesias / Spasticity / Tremor / Balance difficulties / Falls / Seizures / Dizziness / Headache / Dysarthria / Aphasia / Memory loss / Walking difficulties    Past Medical History:   Diagnosis Date     Bipolar disorder (HCC) 6/9/2011     Depression 6/9/2011     Diarrhea     Headache     History of shingles 5/1/2013     Hypertension     Rosacea 01/19/2018   [facility] 1/19/2018     Skin cancer of face 12/9/2001     Past Surgical History:   Procedure Laterality Date     COLONOSCOPY 08/06/2012     LEVEL III - SURGICAL PATHOLOGY, GROSS AND MICROSCOPIC EXAMINATION, PRO     OTHER SURGICAL HISTORY Right 8/27/2018   Procedure: RIGHT BREAST LUMPECTOMY WITH NEEDLE LOCALIZATION AND SENTINEL NODE BIOPSY; Surgeon: Grabiel Salamanca MD; Location: [facility] OR     TONSILLECTOMY   as a child     Social History     Socioeconomic History     Marital status:    Spouse name: Not on file     Number of children: 3     Years of education: Not on file      Highest education level: Not on file   Occupational History     Not on file   Social Needs     Financial resource strain: Not on file     Food insecurity:   Worry: Not on file   Inability: Not on file     Transportation needs:   Medical: Not on file   Non-medical: Not on file   Tobacco Use     Smoking status: Never Smoker     Smokeless tobacco: Never Used   Substance and Sexual Activity     Alcohol use: Yes   Comment: One glass of wine daily.     Drug use: No     Sexual activity: Not on file   Lifestyle     Physical activity:   Days per week: Not on file   Minutes per session: Not on file     Stress: Not on file   Relationships     Social connections:   Talks on phone: Not on file   Gets together: Not on file   Attends Anabaptist service: Not on file   Active member of club or organization: Not on file   Attends meetings of clubs or organizations: Not on file   Relationship status: Not on file     Intimate partner violence:   Fear of current or ex partner: Not on file   Emotionally abused: Not on file   Physically abused: Not on file   Forced sexual activity: Not on file   Other Topics Concern     Not on file   Social History Narrative     Not on file     Not Employed    Family History   Problem Relation Age of Onset     Breast Cancer Mother 80   80     Ophthalmic Disease Mother   glaucoma     Cardiovascular Disease Father     No Known Problems Sister     Musculo-skeletal Disease Daughter   spinal stenosis     Psoriasis Daughter     Cancer Maternal Grandmother   stomach -  in her 60's     No Known Problems Daughter     Addiction Daughter   recovering ETOH     Allergies   Allergen Reactions     Lactose   intol     Current Outpatient Medications   Medication Sig     letrozole (FEMARA) 2.5 MG tablet TAKE 1 TABLET DAILY     budesonide (ENTOCORT EC) 3 MG Capsule Delayed Release Particles Start at 3 tabs daily x 6 weeks then 2 tabs x x 2 weeks then 1 tab x 2 weeks     calcium carbonate-vitamin D (OSCAL 500/200 D-3)  "500-200 MG-UNIT oral tablet Take 1 Tab by mouth two times a day. Take with meals.     letrozole (FEMARA) 2.5 MG tablet Take 1 Tab by mouth one time a day.     Loperamide HCl (IMODIUM OR) Take 1 Tab by mouth as needed.     ARIPiprazole (ABILIFY) 20 MG tablet TAKE 1 TABLET DAILY     FLUoxetine (PROZAC) 20 MG capsule TAKE 2 CAPSULES ONCE DAILY     hydroCHLOROthiazide (HYDRODIURIL) 25 MG tablet TAKE 1 TABLET DAILY     lisinopril (PRINIVIL, ZESTRIL) 40 MG tablet TAKE 1 TABLET DAILY     Multiple Vitamins-Minerals (MULTIVITAMIN OR) Take by mouth. One daily.     Cholecalciferol (VITAMIN D-3) 1000 UNITS CAPS Take by mouth. One tablet daily.     bismuth subsalicylate (PEPTO-BISMOL) 262 MG chewable tablet Chew and swallow 2 Tabs four times a day as needed for Diarrhea. Do not exceed 16 tablets per 24 hours.     No current facility-administered medications for this visit.       Vitals:   06/18/19 1358   BP: 137/74   Pulse: 67   Weight: 148 lb (67.1 kg)   Height: 5' 3\" (1.6 m)       PHYSICAL EXAM:    Constitutional:  No acute distress.     Cardiovascular:   Cardiac rhythm is regular.     Pulmonary:  Lungs clear to auscultation. No wheezing present.     Abdominal:   Soft and non-tender.     NEURO EXAM:    Mental Status:     The patient is alert and oriented to person, place and time.   Naming and repetition intact.   Normal Attention and Concentration.   Patient is able to follow 3 step commands.   Remote memory intact, delayed recall 2/3.  Intact Langauge/Speech without aphasia, no dysarthria    Cranial Nerve Exam:    II: Pupils equal, round, and reactive. Visual fields are full to confrontation.  III, IV, VI: Extraocular movements full. No nystagmus.  V: Facial sensation intact throughout.   VII: No facial weakness or asymmetry.   VIII: Hearing grossly intact.  IX,X: Normal and symmetric spontaneous elevation of the palate.   XI: Shoulder Shrug normal, SCM 5/5 bilaterally.   XII: Tongue protrudes midline.     Motor Exam:  No " pronator drift.   Bulk and tone normal.   Strength:    Deep Tendon Reflexes:     Biceps Triceps BR Knee Ankle   L 2 2 2 2 2   R 2 2 2 2 2     Upper Motor Neuron Signs:  Babinski sign: Plantars downgoing.  Clonus: none.    Sensory Examination:  Sensation is intact to light touch  Romberg sign: mild swaying.     Gait:  Gait: decreased stride length, had difficulty in walking in straight line.   Tandem gait: abnormal.  Posture: normal.     Coordination:   Dysmetria or ataxia with finger-nose-finger: none  Intention tremor: none    Movement Exam:   Hypomimia: Absent  Hypophonia: none  Tone: Mild cogwheel rigidity noted in the left biceps. No rigidity in the left supinator.  Tremor: No resting tremors were noted. Fine low amplitude, high-frequency tremors were noted symmetrically in both hands when arms were outstretched in front antigravity. While walking, she had transient moderate amplitude, moderate frequency parkinsonian tremor in the left arm.  Fine finger movements: normal/no arrests. Stands without pushing, walks with reduced stride, absent left arm swing, significantly reduced on the right, turns well, overall slower. Pull test: falls backward.    DATA REVIEWED:     Previous Imaging:   MRI Brain without contrast: 10/3/2011  1.  Mild hyperintensities surrounding the periventricular white matter   suggests mild chronic small vessel ischemic disease in a patient of this   age.  2.  No acute intracranial process by MRI.  3.  Sinus disease, as described above, with trace amount of fluid within   the mastoid air cells bilaterally.    Component  Latest Ref Rng & Units 5/8/2019   Cholesterol  114 - 200 mg/dL 193   HDL CHOLESTEROL  40 - 60 mg/dL 73 (H)   TRIGLYCERIDES  10 - 200 mg/dL 37   LDL- CALCULATED  mg/dL 113   A1C (HGB AIC)  4.0 - 5.6 % 5.5   Est Average Glucose  mg/dL 111     IMPRESSION:   (G20) Parkinsonism, unspecified Parkinsonism type (HCC) (primary encounter diagnosis)  (R25.1) Tremor  (R29.818) Difficulty  balancing  (R26.9) Gait difficulty    Patient was referred to neurology clinic for the concern of Parkinson disease. The tremor that patient is complaining of does seem to be essential tremor on history and examination, onset was more than 10 years ago even before Abilify was started. Although, there were few features of parkinsonism on examination including cogwheel rigidity in the left biceps, reduced stride length, significantly reduced arm swing L>R, positive retropulsion testing. It is not completely clear if she has parkinsonism due to neuroleptic therapy vs idiopathic Parkinson disease. But I have a suspicion that there may be underlying idiopathic Parkinson disease as some of the examination findings are asymmetric. I believe that Bhavik scan will be helpful in the situation. I would like to get a second opinion from movement disorder subspecialty in the [facility] about the diagnosis and further management. Bhavik scan may be performed in the [facility] if felt appropriate by the team there.    PLAN:  Referral to [facility], second opinion  Return to neurology clinic in 3 to 4 months.    TERESA Newberry  Neurology Physician  [facility]     This note was created using voice recognition software and may contain unintended word substitutions.    Electronically signed by Tolu Kuo MBBS at 06/18/2019 2:42 PM CDT

## 2020-05-22 ENCOUNTER — VIRTUAL VISIT (OUTPATIENT)
Dept: NEUROLOGY | Facility: CLINIC | Age: 75
End: 2020-05-22
Payer: MEDICARE

## 2020-05-22 DIAGNOSIS — F25.9 SCHIZOAFFECTIVE DISORDER, UNSPECIFIED TYPE (H): ICD-10-CM

## 2020-05-22 DIAGNOSIS — G20.C PARKINSONISM, UNSPECIFIED PARKINSONISM TYPE (H): Primary | ICD-10-CM

## 2020-05-22 DIAGNOSIS — G24.01 TARDIVE DYSKINESIA: ICD-10-CM

## 2020-05-22 DIAGNOSIS — G21.19 DRUG-INDUCED PARKINSONISM (H): ICD-10-CM

## 2020-05-22 RX ORDER — SERTRALINE HYDROCHLORIDE 100 MG/1
100 TABLET, FILM COATED ORAL AT BEDTIME
COMMUNITY
Start: 2020-05-22 | End: 2021-09-09

## 2020-05-22 RX ORDER — CHOLECALCIFEROL (VITAMIN D3) 50 MCG
1 TABLET ORAL DAILY
COMMUNITY
Start: 2020-05-22 | End: 2020-07-15

## 2020-05-22 RX ORDER — POTASSIUM CHLORIDE 750 MG/1
10 TABLET, EXTENDED RELEASE ORAL 2 TIMES DAILY
COMMUNITY
Start: 2020-05-22 | End: 2020-07-15

## 2020-05-22 RX ORDER — IBUPROFEN 600 MG/1
TABLET, FILM COATED ORAL
COMMUNITY
Start: 2019-12-16 | End: 2020-05-22

## 2020-05-22 RX ORDER — TOPIRAMATE 25 MG/1
TABLET, FILM COATED ORAL
COMMUNITY
Start: 2020-04-08 | End: 2020-05-22

## 2020-05-22 RX ORDER — MESALAMINE 1.2 G/1
TABLET, DELAYED RELEASE ORAL
COMMUNITY
Start: 2020-03-20 | End: 2020-05-22

## 2020-05-22 RX ORDER — ALBUTEROL SULFATE 90 UG/1
AEROSOL, METERED RESPIRATORY (INHALATION)
COMMUNITY
Start: 2020-04-09 | End: 2021-09-23

## 2020-05-22 RX ORDER — LISINOPRIL/HYDROCHLOROTHIAZIDE 10-12.5 MG
1 TABLET ORAL DAILY
COMMUNITY
Start: 2020-05-22 | End: 2020-07-15

## 2020-05-22 RX ORDER — ZOLEDRONIC ACID 0.04 MG/ML
INJECTION, SOLUTION INTRAVENOUS
COMMUNITY
Start: 2020-05-22

## 2020-05-22 RX ORDER — TOPIRAMATE 25 MG/1
TABLET, FILM COATED ORAL
COMMUNITY
Start: 2020-05-22 | End: 2020-07-15

## 2020-05-22 RX ORDER — TOPIRAMATE 50 MG/1
TABLET, FILM COATED ORAL
COMMUNITY
Start: 2020-03-05 | End: 2020-05-22

## 2020-05-22 RX ORDER — LISINOPRIL 40 MG/1
TABLET ORAL
COMMUNITY
Start: 2019-11-04 | End: 2020-05-22

## 2020-05-22 RX ORDER — SERTRALINE HYDROCHLORIDE 100 MG/1
TABLET, FILM COATED ORAL
COMMUNITY
Start: 2020-03-23 | End: 2020-05-22

## 2020-05-22 RX ORDER — LOPERAMIDE HYDROCHLORIDE 2 MG/1
2 TABLET ORAL 4 TIMES DAILY PRN
COMMUNITY
Start: 2020-05-22

## 2020-05-22 RX ORDER — TAMOXIFEN CITRATE 20 MG/1
20 TABLET ORAL DAILY
COMMUNITY
Start: 2020-05-22

## 2020-05-22 NOTE — LETTER
May 22, 2020       TO: Torri Cuevas  [address] [facility] [address] [facility] [address]       Dear Faith,    We are writing to inform you of your test results.    {ump results letter list:[id number]}    No results found from the In Basket message.    ***

## 2020-05-22 NOTE — LETTER
"5/22/2020      RE: Torri ALCANTARA Faith  [address]  [facility] [address]             VIDEO VISIT    Date of Visit: May 22, 2020  Name: Torri Cuevas \"Torri\"  Kevin  Date of Birth 1945  [facility] [address]  [phone] (M)  [email or web address]  [email or web address]  No proxy    [email or web address]    Kevin Cuevas  Same number    Assessment:  (G20) Parkinsonism, unspecified Parkinsonism type (H)  (primary encounter diagnosis) - presumed medication induced.     She has features of tardive dyskinesias that have present since feb 2020.    Tremors resolved when she tapered off abilify summer 2019 after seeing Dr. Layton 7/2019    Seen by Dr. Layton and Dr. Kuo    Has not seen psychiatry     Dr. Amol Tran [place]  3/2020 visit  Sertraline was started for mood and her mood is better.       08/14/2019 Appointment Oncology Jessica Steven, APRN, CNP    [address], [place] [address] [phone] (Fax)             Medications            Aripiprazole abilify 20mg Off        Bismuth-subsalicylate pepto-bismol 262mtn ?taking       Budesonide entocort ec 3mg  Taper schedule       Calcium citrate vitamin D 315-250 1  1     Lisinopril-hydrochlorothiazide zestoretic 10-12.5mg  1       Loperamide imodium 2mg  4/day as needed       MVI 1       Potassium chloride ER Ktab klorcon 10meq cr 1  1     Sertaline zoloft 100mg   1     Tamoxifen novadex 20mg  Off for 2 weeks       Topiramate topamax 25mg   3     Vitamin d3 2000 international unit(s)  1       Zoledronic acid zometa Every 6 months                                                                                                         Plan:    Psychiatric disorder  Medication induced tremor/parkinsonism better off abilify  Now having features of tardive dyskinesias   Would benefit from ongoing psychiatric care  Consultation with Cara House, Pharm D to review medication options for TD  May need another ECG - had one in [place] " and they may be able to send a PDF of it. Otherwise may need to get one done in [place].    Here are some medication options for TD.     Ingrezza  (Valbenazine)  Ingrezza dosed 40mg daily for 1 week and then can increase to 80mg daily. 1 in 4 patients treated for 6 weeks on 80mg of Ingrezza have severity of symptoms reduced by >50%. Brief review of studies showed this data is from patients with TD due to exposure to neuroleptics. Side effects: somnolence, increased QT interval. Patient is not on any other medications that would contribute to prolonged QT interval. This is a specialty medication and likely would have to be filled through a specialty pharmacy. Cost ~$5000-10,000/month. [facility] could help us navigate coverage for the patient.      Austedo  (deutetrabenazine) dosed 6mg daily and can be increased in 6mg increments up to 48mg/day. 50% of patients reported improvement in their symptoms and 40% of providers observed improvement in their patients. This data is from patients with chorea in [place]. There is an increased risk of suicidal idea and depression with this medication but patient denies any mood disorder. Side effects include sleepiness, diarrhea, tiredness, dry mouth, prolonged QT interval. This medication costs ~$4000 for #60 of the 6mg capsules.    The starting dose is 6 mg once daily. Titrate up at weekly intervals by 6 mg per day to a tolerated dose that reduces chorea, up to a maximum recommended daily dosage of 48 mg (24 mg twice daily)  Administer with food    6mg/d x 1wk; 6mg 2/day x 1wk; 6mg in am and 12mg in pm x 1wk; then 12mg 2/day then 12mg in am and 18mg in pm x 1wk then 18mg 2/day x 1wk then 24mg in am and 18mg in pm x 1 wk then 24mg 2/day      Tardive movement problems    [email or web address]://[email or web address]/home/GetGuidelineContent/613  [email or web address]://[email or web address]/home/GetGuidelineContent/614    Gingko 80mg 3 times per day. Monitor for affects on  "anticoagulation and antidepressants    Clonazepam option          AIMS    Http://[email or web address]/tool_aims.pdf    Facial and Oral movements.   1. Muscle of facial expression   1  2. Lips and perioral area 2  3. Jaw 2  4. Tongue 2    Extremity movements  5. Upper 1  6. Lower 1    Trunk  Neck/shoulder/hips 0    Global judgments  8. Severity of movements 2  9. Incapacitation due to abnormal movements 1  10. Patient awareness of abnormal movements 2    Dental Status  11. Current problems with teeth and/or dentures  0=no;   12. Does patient usually wear dentures  0=no  13. Not edentia = 0   14. Movements are not present at night = 0     AIMS Score 14    I have reviewed the note as documented above.  This accurately captures the substance of my conversation with the patient.  Patient contact time  40 minutes. Over 50% of this visit was spent in patient care and care coordination.     1030am - 1110am    Viraj Ojeda MD      ------------------------------------------------------------------------------------------------------------------------------------------------------------------------    Video-Visit Details    The patient has been notified of following:     \"After a review of the patient s situation, this visit was changed from an in-person visit to a video visit to reduce the risk of COVID-19 exposure.   The patient is being evaluated via a billable video visit.\"    \"This video visit will be conducted via a call between you and your physician/provider. We have found that certain health care needs can be provided without the need for an in-person physical exam.  This service lets us provide the care you need with a video conversation.  If a prescription is necessary we can send it directly to your pharmacy.  If lab work is needed we can place an order for that and you can then stop by our lab to have the test done at a later time.    If during the course of the call the physician/provider feels a video visit is not " "appropriate, you will not be charged for this service.\"     Patient has given verbal consent for Video visit? Yes    Patient would like the video invitation sent by:     Type of service:  Video Visit    Video Start Time:     Video End Time (time video stopped):     Duration:  minutes - see above    Originating Location (pt. Location):     Distant Location (provider location):  [facility] NEUROLOGY     Mode of Communication:  Video Conference via Screenie (and if not possible then doximity)      Viraj Ojeda MD      --------------------------------------------------------------------------------------------------------------    Torri Cuevas is a 74 year old adult who is being evaluated via a billable video visit.      Charts reviewed  Consult from  Images reviewed        I have reviewed and updated the patient's Past Medical History, Social History, Family History and Medication List.    ALLERGIES  Lactose    Lasts visit details if there was a last visit:           14 Review of systems  are negative except for   Patient Active Problem List   Diagnosis     Basal cell carcinoma of face     Excess body and facial hair     HTN (hypertension)     IGT (impaired glucose tolerance)     Major depression in full remission (H)     Malignant neoplasm of upper-outer quadrant of right breast in female, estrogen receptor positive (H)     Mixed hyperlipidemia     Osteoporosis     Schizoaffective disorder (H)        Allergies   Allergen Reactions     Lactose      intol     No past surgical history on file.  No past medical history on file.  Social History     Socioeconomic History     Marital status:      Spouse name: Not on file     Number of children: Not on file     Years of education: Not on file     Highest education level: Not on file   Occupational History     Not on file   Social Needs     Financial resource strain: Not on file     Food insecurity     Worry: Not on file     Inability: Not on file     Transportation " needs     Medical: Not on file     Non-medical: Not on file   Tobacco Use     Smoking status: Never Smoker     Smokeless tobacco: Never Used   Substance and Sexual Activity     Alcohol use: Yes     Frequency: 4 or more times a week     Drinks per session: 1 or 2     Drug use: Not on file     Sexual activity: Not on file   Lifestyle     Physical activity     Days per week: Not on file     Minutes per session: Not on file     Stress: Not on file   Relationships     Social connections     Talks on phone: Not on file     Gets together: Not on file     Attends Anabaptism service: Not on file     Active member of club or organization: Not on file     Attends meetings of clubs or organizations: Not on file     Relationship status: Not on file     Intimate partner violence     Fear of current or ex partner: Not on file     Emotionally abused: Not on file     Physically abused: Not on file     Forced sexual activity: Not on file   Other Topics Concern     Not on file   Social History Narrative     Not on file     No family history on file.  Current Outpatient Medications   Medication Sig Dispense Refill     ARIPiprazole (ABILIFY) 20 MG tablet Take 20 mg by mouth daily       bismuth subsalicylate (PEPTO-BISMOL) 262 MG chewable tablet 2 tablets       budesonide (ENTOCORT EC) 3 MG EC capsule Start at 3 tabs daily x 6 weeks then 2 tabs x x 2 weeks then 1 tab x 2 weeks       calcium carbonate-vitamin D (OSCAL 500/200 D-3) 500-200 MG-UNIT tablet Take 1 tablet by mouth 2 times daily       Cholecalciferol (VITAMIN D-3) 1000 units CAPS        FLUoxetine (PROZAC) 20 MG capsule TAKE 2 CAPSULES ONCE DAILY       hydrochlorothiazide (HYDRODIURIL) 25 MG tablet TAKE 1 TABLET DAILY       letrozole (FEMARA) 2.5 MG tablet TAKE 1 TABLET DAILY       lisinopril (PRINIVIL/ZESTRIL) 40 MG tablet TAKE 1 TABLET DAILY       loperamide (IMODIUM A-D) 2 MG tablet Take 2 mg by mouth as needed for diarrhea       Multiple Vitamins-Minerals (MULTIVITAMIN PO)  Take 1 tablet by mouth daily       Zoledronic Acid (ZOMETA IV)                Surgical History    Surgery Date Site/Laterality Comments   TONSILLECTOMY     as a child     COLONOSCOPY 2012        LEVEL III - SURGICAL PATHOLOGY, GROSS AND MICROSCOPIC EXAMINATION, PRO          OTHER SURGICAL HISTORY 2018 Breast/Right Procedure: RIGHT BREAST LUMPECTOMY WITH NEEDLE LOCALIZATION AND SENTINEL NODE BIOPSY; Surgeon: Grabiel Salamanca MD; Location: [facility] OR     BREAST LUMPECTOMY 2019 Right radiation       Medical History    Medical History Date Comments   Bipolar disorder (HCC) 2011     Depression 2011     Skin cancer of face 2001     History of shingles 2013     Rosacea 2018 [facility] 2018   Headache       Hypertension       Diarrhea         Family History    Medical History Relation Name Comments   Musculo-skeletal Disease Daughter Candy spinal stenosis   Psoriasis Daughter Candy     No Known Problems Daughter Tara     Addiction Daughter Queenie recovering ETOH   Cardiovascular Disease Father       Cancer Maternal Grandmother   stomach -  in her 60's   Breast Cancer Mother   80   Ophthalmic Disease Mother   glaucoma   No Known Problems Sister         Relation Name Status Comments   Daughter Candy Alive     Daughter Tara Alive     Daughter Queenie Alive     Father    (Age 89)     Maternal Grandmother        Mother    (Age 97)     Sister   Alive       Social History    Tobacco Use Types Packs/Day Years Used Date   Never Smoker           Smokeless Tobacco: Never Used           Alcohol Use Drinks/Week oz/Week Comments   Yes     One glass of wine daily.     Sex Assigned at Birth Date Recorded   Female 2018 9:05 AM CDT     Job Start Date Occupation Industry   Not on file Not on file Not on file     Travel History Travel Start Travel End   No recent travel history available.       COVID-19 Exposure Response Date  Recorded   In the last month, have you been in contact with someone who was confirmed or suspected to have Coronavirus / COVID-19? No / Unsure 2020 9:42 AM CDT         ------------------------------------------------------------------------------------------------    Department of Neurology  Movement Disorders Division   Initial Clinic Evaluation      Patient: Torri Cuevas   MRN: [id number]   : 1945   Date of Visit: 2019      Referring Physician: Ayaan     Reason for Referral: ? Parkinson's disease     Impression:  1.  Parkinsonism.  Idiopathic Parkinson's disease versus drug-induced parkinsonism     I think we have to assume that this patient's parkinsonism is drug-induced.  I agree with Dr. Don that the patient's tremor sounds more like essential tremor than parkinsonian tremor.  A DaTscan could be done but current studies indicate only 90% accuracy at best.  I think it makes more sense to taper off Abilify under her family doctor or psychiatrist care.  Most patients improve within 3 months although the rare patient will continue to have symptoms for 6 to 12 months.     Recommendations:  1.  Patient and her  feel that they will contact the psychiatrist in [place] and taper Abilify under surveillance.  2.  They will keep me apprised of her progress through [facility].  3.  She will return in the spring when they are coming back from [place] for reexamination.  If she continues to have evidence of parkinsonism on exam at that time I think a DaTscan or trial of levodopa would make sense.     Thanks to Dr. Kuo for involving me in this patient's care.       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  She raised her family in [place].  She recently retired to [place] and s[place] in [place].  She says her  has noted for at  least 2 years that she shakes in the hands and shuffles when she walks.  Her 's father had Parkinson's disease and he thinks she walks like his father.  He feels she is slowing down.  Recently the patient's friends who have Parkinson's disease or friends with Parkinson's disease encouraged her to see a doctor because of her reduced arm swing.     The patient feels she has had some tremor for 10 years.  This is been in her hands.  It was an action tremor.  She noticed it when she would pick things up.  She notices it when she progresses with yoga.  It does not affect her eating drinking or handwriting.  There is no family history of tremor.  She has never developed resting tremor.     She has been on Abilify for at least 10 years.  She is taking 20 mg a day.  She says it was given to her for depression.  She says it never worked very well.  The person who gave it to her retired.  She simply continued on it.     For the past year her arms do not swing when she walks.  She shuffles and walks slowly.  She is weaker than she was in the past.  There is no family history of Parkinson's disease or tremor.  She fell 2 years ago and hit her head.  There was no period of unconsciousness.  She was seen in the emergency room and released.  She has no history of toxic exposures.     She has not lost her sense of smell.  She has no REM behavior disorder.  She has no restless leg syndrome.  Drooling has been increased.  There is no micrographia.  Swallowing and speech are normal.  She has a little bit of difficulty getting out of a chair and turning in bed.  She has no difficulty dressing.     She has had no falls or freezing.  She has no festination.  She has a bit of bladder frequency without incontinence.  She has had one episode of hallucination where she thought the leak was on fire.  She has no other form hallucination of people or animals.  Her memory is worse than it was in the past but her  said his is not  "out of the ordinary for her age.     The patient saw Dr. Kuo on June 18, 2019.  I read through his excellent consultation.  He found evidence of parkinsonism with cogwheel rigidity in the left biceps reduced stride length, reduced arm swing, and retropulsion.  He wondered about doing a DaTscan.  He recognizes that this could be drug-induced parkinsonism due to neuroleptic therapy.    -----------------------------------------------------------------------------------------------------------------------    Consult Notes  - documented in this encounter  Tolu Kuo MBBS - 06/18/2019 2:00 PM CDT  Formatting of this note might be different from the original.  CHIEF COMPLAINT:   Chief Complaint   Patient presents with     Tremor   she has had these for \"years\"     HISTORY OF PRESENT ILLNESS:     Thank you for allowing me to see Ms. Cuevas for tremor and gait difficulty. Patient is 73 year old female.    Patient was referred to neurology clinic for concern for Parkinson disease. She is complaining about tremor which has been ongoing for many many years. She has been on Abilify for the last 10 years, and according to her, these tremor started even before starting Abilify. She denies any resting tremor, these tremor are more on action requiring fine motor movements. The concern for Parkinson disease came into the picture after one of her known friend (who has Parkinson) told her that she walks like Parkinson patient. She has noticed that she does not move her arms while walking. She has significant balance difficulties and feels like she will fell off. She has difficulty in walking in a straight line. She had a fall around 1 year ago. Most of the symptoms are going on for last 2 years. Her smell sensation is good, denies constipation, has significant sleeping difficulties, memory has declined. Denies REM sleep behaviors or hallucinations.    Patient has past medical history of major depressive disorder, skin cancer " of face, excess body/facial hair, schizoaffective disorder, right breast cancer. She takes letrozole, Abilify, Prozac, lisinopril, hydrochlorothiazide.    REVIEW OF SYSTEMS:  Positives marked in bold.    Constitutional: fever / chills / sweats / fatigue / weight loss.   Eyes: blurred vision / double vision / visual loss / eye pain / light sensitivity.  ENT: ear pain / ringing in ears / decreased hearing / trouble swallowing.  Cardiovascular: chest pain / palpitations.   Respiratory: coughing / wheezing / shortness of breath.   GI: nausea / vomiting / diarrhea / constipation / abdominal pain.   /GYN: urinary incontinence / urinary retention / urgency / frequency / incomplete emptying / kidney stones.  Musculoskeletal: spine pain / joint pain / muscle cramps.   Skin: rash / itching / dryness.   Psych: depression / anxiety / difficulty sleeping / hallucinations / panic attacks.   Heme/Onc: abnormal bleeding, bruising / Lymphadenopathy / Fevers / Chills.   Neuro: weakness / Numbness / Paresthesias / Spasticity / Tremor / Balance difficulties / Falls / Seizures / Dizziness / Headache / Dysarthria / Aphasia / Memory loss / Walking difficulties    Past Medical History:   Diagnosis Date     Bipolar disorder (HCC) 6/9/2011     Depression 6/9/2011     Diarrhea     Headache     History of shingles 5/1/2013     Hypertension     Rosacea 01/19/2018   [facility] 1/19/2018     Skin cancer of face 12/9/2001     Past Surgical History:   Procedure Laterality Date     COLONOSCOPY 08/06/2012     LEVEL III - SURGICAL PATHOLOGY, GROSS AND MICROSCOPIC EXAMINATION, PRO     OTHER SURGICAL HISTORY Right 8/27/2018   Procedure: RIGHT BREAST LUMPECTOMY WITH NEEDLE LOCALIZATION AND SENTINEL NODE BIOPSY; Surgeon: Grabiel Salamanca MD; Location: [facility] OR     TONSILLECTOMY   as a child     Social History     Socioeconomic History     Marital status:    Spouse name: Not on file     Number of children: 3     Years of  education: Not on file     Highest education level: Not on file   Occupational History     Not on file   Social Needs     Financial resource strain: Not on file     Food insecurity:   Worry: Not on file   Inability: Not on file     Transportation needs:   Medical: Not on file   Non-medical: Not on file   Tobacco Use     Smoking status: Never Smoker     Smokeless tobacco: Never Used   Substance and Sexual Activity     Alcohol use: Yes   Comment: One glass of wine daily.     Drug use: No     Sexual activity: Not on file   Lifestyle     Physical activity:   Days per week: Not on file   Minutes per session: Not on file     Stress: Not on file   Relationships     Social connections:   Talks on phone: Not on file   Gets together: Not on file   Attends Bahai service: Not on file   Active member of club or organization: Not on file   Attends meetings of clubs or organizations: Not on file   Relationship status: Not on file     Intimate partner violence:   Fear of current or ex partner: Not on file   Emotionally abused: Not on file   Physically abused: Not on file   Forced sexual activity: Not on file   Other Topics Concern     Not on file   Social History Narrative     Not on file     Not Employed    Family History   Problem Relation Age of Onset     Breast Cancer Mother 80   80     Ophthalmic Disease Mother   glaucoma     Cardiovascular Disease Father     No Known Problems Sister     Musculo-skeletal Disease Daughter   spinal stenosis     Psoriasis Daughter     Cancer Maternal Grandmother   stomach -  in her 60's     No Known Problems Daughter     Addiction Daughter   recovering ETOH     Allergies   Allergen Reactions     Lactose   intol     Current Outpatient Medications   Medication Sig     letrozole (FEMARA) 2.5 MG tablet TAKE 1 TABLET DAILY     budesonide (ENTOCORT EC) 3 MG Capsule Delayed Release Particles Start at 3 tabs daily x 6 weeks then 2 tabs x x 2 weeks then 1 tab x 2 weeks     calcium  "carbonate-vitamin D (OSCAL 500/200 D-3) 500-200 MG-UNIT oral tablet Take 1 Tab by mouth two times a day. Take with meals.     letrozole (FEMARA) 2.5 MG tablet Take 1 Tab by mouth one time a day.     Loperamide HCl (IMODIUM OR) Take 1 Tab by mouth as needed.     ARIPiprazole (ABILIFY) 20 MG tablet TAKE 1 TABLET DAILY     FLUoxetine (PROZAC) 20 MG capsule TAKE 2 CAPSULES ONCE DAILY     hydroCHLOROthiazide (HYDRODIURIL) 25 MG tablet TAKE 1 TABLET DAILY     lisinopril (PRINIVIL, ZESTRIL) 40 MG tablet TAKE 1 TABLET DAILY     Multiple Vitamins-Minerals (MULTIVITAMIN OR) Take by mouth. One daily.     Cholecalciferol (VITAMIN D-3) 1000 UNITS CAPS Take by mouth. One tablet daily.     bismuth subsalicylate (PEPTO-BISMOL) 262 MG chewable tablet Chew and swallow 2 Tabs four times a day as needed for Diarrhea. Do not exceed 16 tablets per 24 hours.     No current facility-administered medications for this visit.       Vitals:   06/18/19 1358   BP: 137/74   Pulse: 67   Weight: 148 lb (67.1 kg)   Height: 5' 3\" (1.6 m)       PHYSICAL EXAM:    Constitutional:  No acute distress.     Cardiovascular:   Cardiac rhythm is regular.     Pulmonary:  Lungs clear to auscultation. No wheezing present.     Abdominal:   Soft and non-tender.     NEURO EXAM:    Mental Status:     The patient is alert and oriented to person, place and time.   Naming and repetition intact.   Normal Attention and Concentration.   Patient is able to follow 3 step commands.   Remote memory intact, delayed recall 2/3.  Intact Langauge/Speech without aphasia, no dysarthria    Cranial Nerve Exam:    II: Pupils equal, round, and reactive. Visual fields are full to confrontation.  III, IV, VI: Extraocular movements full. No nystagmus.  V: Facial sensation intact throughout.   VII: No facial weakness or asymmetry.   VIII: Hearing grossly intact.  IX,X: Normal and symmetric spontaneous elevation of the palate.   XI: Shoulder Shrug normal, SCM 5/5 bilaterally.   XII: Tongue " protrudes midline.     Motor Exam:  No pronator drift.   Bulk and tone normal.   Strength:    Deep Tendon Reflexes:     Biceps Triceps BR Knee Ankle   L 2 2 2 2 2   R 2 2 2 2 2     Upper Motor Neuron Signs:  Babinski sign: Plantars downgoing.  Clonus: none.    Sensory Examination:  Sensation is intact to light touch  Romberg sign: mild swaying.     Gait:  Gait: decreased stride length, had difficulty in walking in straight line.   Tandem gait: abnormal.  Posture: normal.     Coordination:   Dysmetria or ataxia with finger-nose-finger: none  Intention tremor: none    Movement Exam:   Hypomimia: Absent  Hypophonia: none  Tone: Mild cogwheel rigidity noted in the left biceps. No rigidity in the left supinator.  Tremor: No resting tremors were noted. Fine low amplitude, high-frequency tremors were noted symmetrically in both hands when arms were outstretched in front antigravity. While walking, she had transient moderate amplitude, moderate frequency parkinsonian tremor in the left arm.  Fine finger movements: normal/no arrests. Stands without pushing, walks with reduced stride, absent left arm swing, significantly reduced on the right, turns well, overall slower. Pull test: falls backward.    DATA REVIEWED:     Previous Imaging:   MRI Brain without contrast: 10/3/2011  1.  Mild hyperintensities surrounding the periventricular white matter   suggests mild chronic small vessel ischemic disease in a patient of this   age.  2.  No acute intracranial process by MRI.  3.  Sinus disease, as described above, with trace amount of fluid within   the mastoid air cells bilaterally.    Component  Latest Ref Rng & Units 5/8/2019   Cholesterol  114 - 200 mg/dL 193   HDL CHOLESTEROL  40 - 60 mg/dL 73 (H)   TRIGLYCERIDES  10 - 200 mg/dL 37   LDL- CALCULATED  mg/dL 113   A1C (HGB AIC)  4.0 - 5.6 % 5.5   Est Average Glucose  mg/dL 111     IMPRESSION:   (G20) Parkinsonism, unspecified Parkinsonism type (HCC) (primary encounter  diagnosis)  (R25.1) Tremor  (R29.818) Difficulty balancing  (R26.9) Gait difficulty    Patient was referred to neurology clinic for the concern of Parkinson disease. The tremor that patient is complaining of does seem to be essential tremor on history and examination, onset was more than 10 years ago even before Abilify was started. Although, there were few features of parkinsonism on examination including cogwheel rigidity in the left biceps, reduced stride length, significantly reduced arm swing L>R, positive retropulsion testing. It is not completely clear if she has parkinsonism due to neuroleptic therapy vs idiopathic Parkinson disease. But I have a suspicion that there may be underlying idiopathic Parkinson disease as some of the examination findings are asymmetric. I believe that Bhavik scan will be helpful in the situation. I would like to get a second opinion from movement disorder subspecialty in the [facility] about the diagnosis and further management. Bhavik scan may be performed in the [facility] if felt appropriate by the team there.    PLAN:  Referral to [facility], second opinion  Return to neurology clinic in 3 to 4 months.    TERESA Newberry  Neurology Physician  [facility]     This note was created using voice recognition software and may contain unintended word substitutions.    Electronically signed by Tolu Kuo MBBS at 06/18/2019 2:42 PM CDT          Viraj Ojeda MD

## 2020-05-22 NOTE — Clinical Note
"[facility]INICS [facility]  [facility] NEUROLOGY  [address] [address]  [phone]            May 22, 2020          Dear Marichuy Proxy Patient,    We received a request to activate you as a proxy for another patient of [facility] or [place]. In order to do so, we need to activate your [employer] account as well.    Your access code is: [unfilled]       Please access the [employer] website:  -  [place]: [email or web address]  -  [employer] [email or web address].    Below the ID and password fields, select the \"Sign Up Now\" as New User.  You will be prompted to enter the access code listed above as well as additional personal information.  Please follow the directions carefully when creating your username and password.    Once your account is activated, you can access the proxy accounts under \"Shared Medical Records\".    If you allow your access code to , or if you have any questions please call [employer] support at [phone] during normal clinic hours.     Sincerely,        [employer] Customer Service    "

## 2020-05-22 NOTE — PATIENT INSTRUCTIONS
Assessment:  (G20) Parkinsonism, unspecified Parkinsonism type (H)  (primary encounter diagnosis) - presumed medication induced.     She has features of tardive dyskinesias that have present since feb 2020.    Tremors resolved when she tapered off abilify summer 2019 after seeing Dr. Layton 7/2019    Seen by Dr. Layton and Dr. Kuo    Has not seen psychiatry     Dr. Amol Vaz [place]  3/2020 visit  Sertraline was started for mood and her mood is better.       08/14/2019 Appointment Oncology Jessica Steven, APRN, CNP    [address] [place] [address]    [phone] (Fax)             Medications            Aripiprazole abilify 20mg Off        Bismuth-subsalicylate pepto-bismol 262mtn ?taking       Budesonide entocort ec 3mg  Taper schedule       Calcium citrate vitamin D 315-250 1  1     Lisinopril-hydrochlorothiazide zestoretic 10-12.5mg  1       Loperamide imodium 2mg  4/day as needed       MVI 1       Potassium chloride ER Ktab klorcon 10meq cr 1  1     Sertaline zoloft 100mg   1     Tamoxifen novadex 20mg  Off for 2 weeks       Topiramate topamax 25mg   3     Vitamin d3 2000 international unit(s)  1       Zoledronic acid zometa Every 6 months                                                                                                         Plan:    Psychiatric disorder  Medication induced tremor/parkinsonism better off abilify  Now having features of tardive dyskinesias   Would benefit from ongoing psychiatric care  Consultation with Cara House, Pharm D to review medication options for TD  May need another ECG - had one in [place] and they may be able to send a PDF of it. Otherwise may need to get one done in [place].    Here are some medication options for TD.     Ingrezza  (Valbenazine)  Ingrezza dosed 40mg daily for 1 week and then can increase to 80mg daily. 1 in 4 patients treated for 6 weeks on 80mg of Ingrezza have severity of symptoms reduced by >50%.  Brief review of studies showed this data is from patients with TD due to exposure to neuroleptics. Side effects: somnolence, increased QT interval. Patient is not on any other medications that would contribute to prolonged QT interval. This is a specialty medication and likely would have to be filled through a specialty pharmacy. Cost ~$5000-10,000/month. [facility] could help us navigate coverage for the patient.      Austedo  (deutetrabenazine) dosed 6mg daily and can be increased in 6mg increments up to 48mg/day. 50% of patients reported improvement in their symptoms and 40% of providers observed improvement in their patients. This data is from patients with chorea in [place]'s [facility]. There is an increased risk of suicidal idea and depression with this medication but patient denies any mood disorder. Side effects include sleepiness, diarrhea, tiredness, dry mouth, prolonged QT interval. This medication costs ~$4000 for #60 of the 6mg capsules.    The starting dose is 6 mg once daily. Titrate up at weekly intervals by 6 mg per day to a tolerated dose that reduces chorea, up to a maximum recommended daily dosage of 48 mg (24 mg twice daily)  Administer with food    6mg/d x 1wk; 6mg 2/day x 1wk; 6mg in am and 12mg in pm x 1wk; then 12mg 2/day then 12mg in am and 18mg in pm x 1wk then 18mg 2/day x 1wk then 24mg in am and 18mg in pm x 1 wk then 24mg 2/day      Tardive movement problems    Https://[email or web address]  [email or web address]/[email or web address]/[email or web address]    Gingko 80mg 3 times per day. Monitor for affects on anticoagulation and antidepressants    Clonazepam option

## 2020-06-04 ENCOUNTER — TELEPHONE (OUTPATIENT)
Dept: NEUROLOGY | Facility: CLINIC | Age: 75
End: 2020-06-04

## 2020-06-04 ENCOUNTER — ALLIED HEALTH/NURSE VISIT (OUTPATIENT)
Dept: PHARMACY | Facility: CLINIC | Age: 75
End: 2020-06-04
Payer: OTHER GOVERNMENT

## 2020-06-04 DIAGNOSIS — C50.411 MALIGNANT NEOPLASM OF UPPER-OUTER QUADRANT OF RIGHT BREAST IN FEMALE, ESTROGEN RECEPTOR POSITIVE (H): ICD-10-CM

## 2020-06-04 DIAGNOSIS — F25.9 SCHIZOAFFECTIVE DISORDER, UNSPECIFIED TYPE (H): ICD-10-CM

## 2020-06-04 DIAGNOSIS — Z17.0 MALIGNANT NEOPLASM OF UPPER-OUTER QUADRANT OF RIGHT BREAST IN FEMALE, ESTROGEN RECEPTOR POSITIVE (H): ICD-10-CM

## 2020-06-04 DIAGNOSIS — R06.02 SHORTNESS OF BREATH: ICD-10-CM

## 2020-06-04 DIAGNOSIS — G24.01 TARDIVE DYSKINESIA: ICD-10-CM

## 2020-06-04 DIAGNOSIS — K52.9 COLITIS: ICD-10-CM

## 2020-06-04 DIAGNOSIS — F32.5 MAJOR DEPRESSIVE DISORDER IN FULL REMISSION, UNSPECIFIED WHETHER RECURRENT (H): ICD-10-CM

## 2020-06-04 DIAGNOSIS — G24.01 TARDIVE DYSKINESIA: Primary | ICD-10-CM

## 2020-06-04 DIAGNOSIS — M81.0 OSTEOPOROSIS, UNSPECIFIED OSTEOPOROSIS TYPE, UNSPECIFIED PATHOLOGICAL FRACTURE PRESENCE: ICD-10-CM

## 2020-06-04 DIAGNOSIS — Z78.9 TAKES DIETARY SUPPLEMENTS: ICD-10-CM

## 2020-06-04 DIAGNOSIS — I10 ESSENTIAL HYPERTENSION: ICD-10-CM

## 2020-06-04 DIAGNOSIS — R51.9 CHRONIC DAILY HEADACHE: ICD-10-CM

## 2020-06-04 PROCEDURE — 99607 MTMS BY PHARM ADDL 15 MIN: CPT | Performed by: PHARMACIST

## 2020-06-04 PROCEDURE — 99605 MTMS BY PHARM NP 15 MIN: CPT | Performed by: PHARMACIST

## 2020-06-04 RX ORDER — VALBENAZINE 40 MG/1
40 CAPSULE ORAL DAILY
Qty: 30 CAPSULE | Refills: 11 | Status: SHIPPED | OUTPATIENT
Start: 2020-06-04 | End: 2020-06-08

## 2020-06-04 SDOH — HEALTH STABILITY: MENTAL HEALTH: HOW OFTEN DO YOU HAVE A DRINK CONTAINING ALCOHOL?: NOT ASKED

## 2020-06-04 SDOH — HEALTH STABILITY: MENTAL HEALTH: HOW MANY STANDARD DRINKS CONTAINING ALCOHOL DO YOU HAVE ON A TYPICAL DAY?: NOT ASKED

## 2020-06-04 NOTE — PROGRESS NOTES
MTM ENCOUNTER  SUBJECTIVE/OBJECTIVE:                           Torri Cuevas is a 74 year old adult called for an initial visit. Torri Cuevas was referred to me from Dr. Ojeda. Patient was accompanied by .     Patient consented to a telehealth visit: yes  Telemedicine Visit Details  Type of service:  Telephone visit  Start Time: 11:00 am  End Time: 11:50 am  Originating Location (pt. Location): Home  Distant Location (provider location):  [facility] NEUROLOGY CLINIC MTM  Mode of Communication:  Telephone    Chief Complaint: discuss movement disorders    Allergies/ADRs: Reviewed in EHR  Tobacco:  reports that Torri Cuevas has never smoked. Torri Cuevas has never used smokeless tobacco.  Alcohol: none  Caffeine: no caffeine  Activity: not discussed  PMH: Reviewed in EHR    Medication Adherence/Access:   Patient uses pill box(es).  Patient takes medications 4 time(s) per day.   Per patient, misses medication 0 times per week.   Medication barriers: none.   The patient fills medications at [facility]: NO, fills medications at [facility] mail order pharmacy.    Tardive dyskinesia: Not currently taking any medication. Patient endorses involuntary movement in her mouth/tongue. See Dr. Ojeda's note for AIMS score. Patient states the movement has affected her speech and other people notice it as well. She would like to try a medication treatment for the orofacial movements.     Schizoaffective disorder/Depression:  Current medications include: Sertraline 100 mg once daily. This medication is reportedly working very well for her and she states her mood is much better. She was previously on Abilify and she tapered off of it last summer due to tremor, which has now resolved.     Chronic daily headache: Current preventive medications include: Topiramate 75 mg once daily, started in January or February 2020. She does not take any abortive medication. The topiramate reportedly just started helping her  headaches two weeks ago and she states she has had barely any headaches since. She is pleased with the effect. She did note that she has lost about 25 pounds since starting the medication so she is diligently eating more and weighing herself every day. Patient does not have any symptoms associated with the headaches (photophobia, phonophobia, nausea etc).     Colitis/diarrhea: Currently taking loperamide 4 times/day and budesonide 3 mg, 2 tablets/day - she is currently tapering off of this medication because it has been ineffective for her diarrhea symptoms. She reportedly also took Lialda in the past and developed shortness of breath/edema after 20-30 days so it was stopped. It is unclear if these symptoms can be attribted to the medication as the shortness of breath has somewhat persisted. Patient states she has had diarrhea since 12/8/19 and nothing has really improved it. She follows with GI and will see them again after completely budesonide course. Some of those symptoms went away but not all. She has had diarrhea since December 8.     Hypertension: Current medications include lisinopril-hydrochlorothiazide 10-12.5 mg daily and potassium chloride ER 10 mEq twice daily. She was having issues with low blood pressure recently so the dose was decreased. Patient does self-monitor BP. Home BP monitoring in range of 's systolic, which is low, so she is checking in with PCP regularly. She will have K+ checked in one month.    Breast cancer: Currently taking tamoxifen 20 mg daily. She is going to have to have another ultrasound later in the summer because of a cyst that was discovered on a gynecologic exam. Uterine cancer has been ruled out.    Osteoporosis: Current therapy includes: zoledronic Acid (Zometa) IV every 6 months (skipped the last one due to COVID19; patient has been on current therapy for 1 years/2 infusions), calcium citrate/vitamin D3 315 mg twice daily, and vitamin D3 2000 units daily. Pt is not  "experiencing side effects.  Pt is getting the following sources of dietary calcium: 3-4 servings/day  Last vitamin D level: N/A  DEXA History: 8/7/19 - [facility]  Risk factors: post-menopausal    SOB: She is taking albuterol, which was prescribed by a pulmonologist. She is supposed to use the inhaler 3 times/day but she oftentimes forgets to use it. Both heart and lungs are considered \"fine.\" Her lung function was 15% lower than normal. He asked her to try the inhaler and it has done nothing for her. Spirometry showed a slight improvement in breathing but she can't tell. SOB doesn't affect her ability to do anything.    Vitamins/supplements: Taking daily Centrum Silver MVI.     Today's Vitals: There were no vitals taken for this visit.  (telemedicine visit)    ASSESSMENT:                            Medication Adherence: excellent, no issues identified    Tardive dyskinesia: Needs improvement. Patient would benefit from a trial of a VMAT2 inhibitor for her TD symptoms. She is very interested in trying a medication. Valbenazine (Ingrezza) has a good patient assistance program if the copay is too high.     Schizoaffective disorder/Depression:  Stable.     Chronic daily headache: Improving.    Colitis/diarrhea: Needs improvement. Plan to follow up with GI.    Hypertension: Stable. Plan to follow up with PCP.    Breast cancer: Stable.     Osteoporosis: Needs improvement. Patient would benefit from a slightly higher dose of calcium.     SOB: Needs improvement. Albuterol is typically used PRN for SOB and if the patient is not seeing any benefit from it, she should likely stop using it. I advised that she stop using it and follow up with pulmonology.     Vitamins/supplements: stable.    PLAN:                            1. Trial of Ingrezza 40 mg daily for TD movements.   2. Increase Calcium supplement to 3 tablets/day.   2. Stop using albuterol inhaler regularly. Only use as needed for shortness of breath, but if it " doesn't help then don't use it at all.     I spent 50 minutes with this patient today. Medication changes made via verbal approval from Dr. Ojeda. A copy of the visit note was provided to the patient's referring provider.    Will follow up in one month: 7/16/20    The patient was sent via [employer] a summary of these recommendations.     Cara House, Pharm.D.  Medication Therapy Management Pharmacist  Phone: [phone]

## 2020-06-04 NOTE — TELEPHONE ENCOUNTER
Prior Authorization Approval    Authorization Effective Date: 5/5/2020  Authorization Expiration Date: 6/4/2021  Medication: Ingrezza 40MG Capsules (APPROVED)  Approved Dose/Quantity: 30 days  Reference #:     Insurance Company: [employer] - Phone [phone] Fax [phone]  Expected CoPay:       CoPay Card Available:      Foundation Assistance Needed:    Which Pharmacy is filling the prescription (Not needed for infusion/clinic administered): [facility]  [address]  Pharmacy Notified:    Patient Notified:

## 2020-06-08 RX ORDER — VALBENAZINE 40 MG/1
40 CAPSULE ORAL DAILY
Qty: 30 CAPSULE | Refills: 11 | Status: SHIPPED | OUTPATIENT
Start: 2020-06-08 | End: 2020-08-11

## 2020-06-08 NOTE — TELEPHONE ENCOUNTER
[facility] Call Center    Phone Message    May a detailed message be left on voicemail: yes     Reason for Call: Other: This prescription needs to be [facility], [facility] cannot [facility]. Message was left from [facility] on the cancellation line. Any questions please call them      Action Taken: Message routed to:  [facility] ([facility] Neurology    Travel Screening: Not Applicable

## 2020-06-08 NOTE — TELEPHONE ENCOUNTER
Re-ordered Ingrezza to [facility] specialty pharmacy as preferred by patient's insurance. Rx ordered per verbal order from Dr. Ojeda.     Cara House, Pharm.D.  Medication Therapy Management Pharmacist  Phone: [phone]

## 2020-06-11 PROBLEM — G24.01 TARDIVE DYSKINESIA: Status: ACTIVE | Noted: 2020-06-11

## 2020-06-11 NOTE — PATIENT INSTRUCTIONS
Recommendations from today's MTM visit:                                                    MTM (medication therapy management) is a service provided by a clinical pharmacist designed to help you get the most of out of your medicines.     1. Trial of Ingrezza 40 mg daily for tardive dyskinesia movements.   2. Increase Calcium supplement to 3 tablets/day.   2. Stop using albuterol inhaler regularly. Only use as needed for shortness of breath, but if it doesn't help then don't use it at all.     It was great to speak with you today.  I value your experience and would be very thankful for your time with providing feedback on our clinic survey. You may receive a survey via email or text message in the next few days.     Next MTM visit: 7/16/20 at 11 am - I will call you!    To schedule another MTM appointment, please call the clinic directly or you may call the MTM scheduling line at [phone] or toll-free at [phone].     My Clinical Pharmacist's contact information:                                                      It was a pleasure talking with you today!  Please feel free to contact me with any questions or concerns you have.      Cara House, Pharm.D.  Medication Therapy Management Pharmacist  Phone: [phone]

## 2020-07-15 ENCOUNTER — ALLIED HEALTH/NURSE VISIT (OUTPATIENT)
Dept: PHARMACY | Facility: CLINIC | Age: 75
End: 2020-07-15
Payer: OTHER GOVERNMENT

## 2020-07-15 DIAGNOSIS — F25.9 SCHIZOAFFECTIVE DISORDER, UNSPECIFIED TYPE (H): ICD-10-CM

## 2020-07-15 DIAGNOSIS — G24.01 TARDIVE DYSKINESIA: Primary | ICD-10-CM

## 2020-07-15 DIAGNOSIS — I10 ESSENTIAL HYPERTENSION: ICD-10-CM

## 2020-07-15 DIAGNOSIS — K52.9 COLITIS: ICD-10-CM

## 2020-07-15 DIAGNOSIS — F32.5 MAJOR DEPRESSIVE DISORDER IN FULL REMISSION, UNSPECIFIED WHETHER RECURRENT (H): ICD-10-CM

## 2020-07-15 PROCEDURE — 99606 MTMS BY PHARM EST 15 MIN: CPT | Performed by: PHARMACIST

## 2020-07-15 RX ORDER — LOSARTAN POTASSIUM 25 MG/1
25 TABLET ORAL DAILY
COMMUNITY

## 2020-07-15 NOTE — PROGRESS NOTES
"MTM ENCOUNTER  SUBJECTIVE/OBJECTIVE:                           Torri Cuevas is a 74 year old adult called for a follow-up visit. Torri Cuevas was referred to me from Dr. Ojeda. Patient was accompanied by , Martin. Today's visit is a follow-up MTM visit from 6/4/20    Patient consented to a telehealth visit: yes  Telemedicine Visit Details  Type of service:  Telephone visit  Start Time: 11:02 AM  End Time: 11:16 AM  Originating Location (pt. Location): Home  Distant Location (provider location):  [facility] NEUROLOGY CLINIC MTM  Mode of Communication:  Telephone    Chief Complaint: follow up on TD    Allergies/ADRs: Reviewed in EHR  Tobacco:  reports that Torri Cuevas has never smoked. Torri Cuevas has never used smokeless tobacco.  Alcohol: none  Caffeine: no caffeine  Activity: not discussed  PMH: Reviewed in EHR    Medication Adherence/Access: no issues reported    Tardive dyskinesia: Currently taking valbenazine (Ingrezza) 40 mg daily.  states that her mouth movements have been \"dramatically improved\" since starting this medication. Her tongue no longer sticks out like it used to. She does periodically still speak slowly (especially when she is tired) and has some minor tongue curling but this is tolerable. Denies any side effects.    Schizoaffective disorder/Depression:  Current medications include: Sertraline 100 mg once daily. Patient states her mood continues to be good on this medication.     Hypertension: Current medications include losartan 25 mg daily.  This was recently switched from lisinopril-hydrochlorothiazide. Patient does note increased edema and has been in touch with her PCP about this.     Diarrhea: Currently taking loperamide 2 mg 2-3 times/day. No longer taking oral budesonide and feels this is stable for her.     Today's Vitals: There were no vitals taken for this visit.  (telemedicine visit)    ASSESSMENT:                            Medication Adherence: excellent, no " issues identified    Tardive dyskinesia: Improved. Discussed that Ingrezza could be increased to 80 mg if needed but will have the patient follow up with Dr. Ojeda next month to discuss. He will hopefully be able to do a virtual exam/AIMS score.    Schizoaffective disorder/Depression:  Stable.    Hypertension: Needs improvement. Defer to local PCP for management.     Diarrhea: Stable.       PLAN:                            1. Follow up with Dr. Ojeda in August.  2. Consideration for increasing Ingrezza to 80 mg daily, if desired.      I spent 14 minutes with this patient today. I offer these suggestions for consideration by Dr. Ojeda. A copy of the visit note was provided to the patient's referring provider.    Will follow up in 1 year or sooner if needed.    The patient declined a summary of these recommendations.     Cara House, Pharm.D.  Medication Therapy Management Pharmacist  Phone: [phone]

## 2020-07-15 NOTE — Clinical Note
She responded well to valbenazine. I am going to have her f/up with you next month for virtual visit and possibly increase to 80 mg if needed. Perhaps you can do another AIMS with her.

## 2020-08-08 RX ORDER — CHOLECALCIFEROL (VITAMIN D3) 50 MCG
2000 TABLET ORAL
COMMUNITY
Start: 2020-05-22 | End: 2020-08-11

## 2020-08-08 RX ORDER — LISINOPRIL/HYDROCHLOROTHIAZIDE 10-12.5 MG
1 TABLET ORAL DAILY
COMMUNITY
Start: 2020-08-08 | End: 2020-08-11

## 2020-08-08 NOTE — PROGRESS NOTES
VIDEO VISIT    Date of Visit: August 11, 2020  Name: Torri Cuevas  Date of Birth 1945  [facility] [address]  [phone] (M)  [email or web address] (P)  mychart  proxy     [email or web address]     Kevin Cuevas  Same number    Psychiatry - not seeing     Dr. Negro Conde is her pcp       Dr. Amol Tran [place] 3/2020 visit     Sertraline was started for mood and her mood is better.     She responded well to valbenazine. I am going to have her f/up with you next month for virtual visit and possibly increase to 80 mg if needed. Perhaps you can do another AIMS with her.  She thinks her movements are better. She still has some tongue protrusion and has some face     Will be going to [place] 1st November and will be there for 6 months.      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   1. Muscle of facial expression   1->1  2. Lips and perioral area 2->1 or 2  3. Jaw 2 ->0  4. Tongue 2 ->1     Extremity movements  5. Upper 1->0  6. Lower 1->0     Trunk  Neck/shoulder/hips 0     Global judgments  8. Severity of movements 2->1  9. Incapacitation due to abnormal movements 1->0  10. Patient awareness of abnormal movements 2->1     Dental Status  11. Current problems with teeth and/or dentures  0=no;   12. Does patient usually wear dentures  0=no  13. Not edentia = 0   14. Movements are not present at night = 0      AIMS Score 14->5       Medications                 Calcium citrate vitamin D 315-250 2         Loperamide imodium 2mg  4/day as needed           Losartan cozaar 25mg  1       MVI 1           Sertaline zoloft 100mg     1       Tamoxifen novadex 20mg  1           Valbenazine  "ingrezza 40mg  1->2       Ventolin  (90 base) mcg/act inhaler As needed       Vitamin d3 2000 international unit(s)  Not taking           Zoledronic acid zometa Every 6 months                             Plan:    Would like to try a higher dose of valbenazine/ingrezza to see if we can get additional benefit, ie increase form 40 to 80mg  Before they head to [place]    They have a problem with the insurance company as they used the date of payment rather than the start date so they are out of medication as of today.     Return back to see me May 2020    Sent message to [facility] to see if we can medication shipped expeditiously to her as she is out.     [email or web address]_[email or web address]_and_outcomes/[email or web address]    I have reviewed the note as documented above.  This accurately captures the substance of my conversation with the patient.  Patient contact time  15  minutes. Over 50% of this visit was spent in patient care and care coordination.   Time of visit is 1250pm - 110pm    Viraj Ojeda MD      ------------------------------------------------------------------------------------------------------------------------------------------------------------------------    Video-Visit Details    The patient has been notified of following:     \"After a review of the patient s situation, this visit was changed from an in-person visit to a video visit to reduce the risk of COVID-19 exposure.   The patient is being evaluated via a billable video visit.\"    \"This video visit will be conducted via a call between you and your physician/provider. We have found that certain health care needs can be provided without the need for an in-person physical exam.  This service lets us provide the care you need with a video conversation.  If a prescription is necessary we can send it directly to your pharmacy.  If lab work is needed we can place an order for that and you " "can then stop by our lab to have the test done at a later time.    If during the course of the call the physician/provider feels a video visit is not appropriate, you will not be charged for this service.\"     Patient has given verbal consent for Video visit? Yes    Patient would like the video invitation sent by:     Type of service:  Video Visit    Video Start Time:     Video End Time (time video stopped):     Duration:  minutes - see above    Originating Location (pt. Location):     Distant Location (provider location):  [facility] NEUROLOGY     Mode of Communication:  Video Conference via C[employer]vy (and if not possible then doximity)      Viraj Ojeda MD      --------------------------------------------------------------------------------------------------------------    Torri Cuevas is a 75 year old adult who is being evaluated via a billable video visit.      Charts reviewed  Consult from  Images reviewed        I have reviewed and updated the patient's Past Medical History, Social History, Family History and Medication List.    ALLERGIES  Lactose    Lasts visit details if there was a last visit:         Medications                                                                                                                                                                                                              14 Review of systems  are negative except for   Patient Active Problem List   Diagnosis     Basal cell carcinoma of face     Excess body and facial hair     HTN (hypertension)     IGT (impaired glucose tolerance)     Major depression in full remission (H)     Malignant neoplasm of upper-outer quadrant of right breast in female, estrogen receptor positive (H)     Mixed hyperlipidemia     Osteoporosis     Schizoaffective disorder (H)     BMI 22.0-22.9, adult     Drug-induced Parkinsonism (H)     Tardive dyskinesia        Allergies   Allergen Reactions     Lactose      intol     Past Surgical " History:   Procedure Laterality Date     COLONOSCOPY       LUMPECTOMY BREAST Right 2019    radiation. localization and sentinel node biopsy dr bello sanford     TONSILLECTOMY       Past Medical History:   Diagnosis Date     Drug-induced Parkinsonism (H) 5/15/2020     Tardive dyskinesia 6/11/2020     Social History     Socioeconomic History     Marital status:      Spouse name: Not on file     Number of children: Not on file     Years of education: Not on file     Highest education level: Not on file   Occupational History     Not on file   Social Needs     Financial resource strain: Not on file     Food insecurity     Worry: Not on file     Inability: Not on file     Transportation needs     Medical: Not on file     Non-medical: Not on file   Tobacco Use     Smoking status: Never Smoker     Smokeless tobacco: Never Used   Substance and Sexual Activity     Alcohol use: Not Currently     Drug use: Not on file     Sexual activity: Not on file   Lifestyle     Physical activity     Days per week: Not on file     Minutes per session: Not on file     Stress: Not on file   Relationships     Social connections     Talks on phone: Not on file     Gets together: Not on file     Attends [employer] service: Not on file     Active member of club or organization: Not on file     Attends meetings of clubs or organizations: Not on file     Relationship status: Not on file     Intimate partner violence     Fear of current or ex partner: Not on file     Emotionally abused: Not on file     Physically abused: Not on file     Forced sexual activity: Not on file   Other Topics Concern     Not on file   Social History Narrative     Not on file     Family History   Problem Relation Age of Onset     Breast Cancer Mother      Glaucoma Mother      Cardiovascular Father      Stomach Cancer Maternal Grandmother      Musculoskeletal Disorder Daughter         spinal stnosis     Psoriasis Daughter      Other - See Comments Daughter         no  one problems     Alcoholism Daughter      Current Outpatient Medications   Medication Sig Dispense Refill     calcium citrate-vitamin D (CALCIUM CITRATE+D3) 315-250 MG-UNIT TABS per tablet Take 2 tablets by mouth daily        loperamide (IMODIUM A-D) 2 MG tablet Take 1 tablet (2 mg) by mouth 4 times daily as needed for diarrhea       losartan (COZAAR) 25 MG tablet Take 25 mg by mouth daily       Multiple Vitamins-Minerals (MULTIVITAMIN PO) Take 1 tablet by mouth daily (Equate brand)       sertraline (ZOLOFT) 100 MG tablet Take 1 tablet (100 mg) by mouth At Bedtime       tamoxifen (NOLVADEX) 20 MG tablet Take 1 tablet (20 mg) by mouth daily       valbenazine (INGREZZA) 40 MG capsule Take 1 capsule (40 mg) by mouth daily 30 capsule 11     VENTOLIN  (90 Base) MCG/ACT inhaler INHALE 1 TO 2 PUFFS BY MOUTH THREE TIMES DAILY AS NEEDED FOR DIFFICULTY BREATHING       zoledronic acid (ZOMETA) 4 MG/100ML SOLN Every 6 months

## 2020-08-11 ENCOUNTER — VIRTUAL VISIT (OUTPATIENT)
Dept: NEUROLOGY | Facility: CLINIC | Age: 75
End: 2020-08-11
Payer: MEDICARE

## 2020-08-11 DIAGNOSIS — G21.19 DRUG-INDUCED PARKINSONISM (H): Primary | ICD-10-CM

## 2020-08-11 DIAGNOSIS — G24.01 TARDIVE DYSKINESIA: ICD-10-CM

## 2020-08-11 RX ORDER — VALBENAZINE 40 MG/1
80 CAPSULE ORAL DAILY
Qty: 60 CAPSULE | Refills: 11 | Status: SHIPPED | OUTPATIENT
Start: 2020-08-11 | End: 2021-08-02

## 2020-08-11 NOTE — LETTER
8/11/2020       RE: Torri Cuevas  [address]  [facility] [address]     Dear Colleague,    Thank you for referring your patient, Torri Cuevas, to the [facility]UROLOG[facility]. Please see a copy of my visit note below.        VIDEO VISIT    Date of Visit: August 11, 2020  Name: Torri Cuevas  Date of Birth 1945  [facility] [address]  [phone] (M)  [email or web address]  mychart  proxy     [email or web address]     Kevin Cuevas  Same number    Psychiatry - not seeing     Dr. Negro Conde is her pcp       Dr. Amol Tran [place] 3/2020 visit     Sertraline was started for mood and her mood is better.     She responded well to valbenazine. I am going to have her f/up with you next month for virtual visit and possibly increase to 80 mg if needed. Perhaps you can do another AIMS with her.  She thinks her movements are better. She still has some tongue protrusion and has some face     Will be going to [place] 1st November and will be there for 6 months.      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   1. Muscle of facial expression   1->1  2. Lips and perioral area 2->1 or 2  3. Jaw 2 ->0  4. Tongue 2 ->1     Extremity movements  5. Upper 1->0  6. Lower 1->0     Trunk  Neck/shoulder/hips 0     Global judgments  8. Severity of movements 2->1  9. Incapacitation due to abnormal movements 1->0  10. Patient awareness of abnormal movements 2->1     Dental Status  11. Current problems with teeth and/or dentures  0=no;   12. Does patient usually wear dentures  0=no  13. Not edentia = 0   14. Movements are not present at night = 0  "     AIMS Score 14->5       Medications                 Calcium citrate vitamin D 315-250 2         Loperamide imodium 2mg  4/day as needed           Losartan cozaar 25mg  1       MVI 1           Sertaline zoloft 100mg     1       Tamoxifen novadex 20mg  1           Valbenazine ingrezza 40mg  1->2       Ventolin  (90 base) mcg/act inhaler As needed       Vitamin d3 2000 international unit(s)  Not taking           Zoledronic acid zometa Every 6 months                             Plan:    Would like to try a higher dose of valbenazine/ingrezza to see if we can get additional benefit, ie increase form 40 to 80mg  Before they head to [place]    They have a problem with the insurance company as they used the date of payment rather than the start date so they are out of medication as of today.     Return back to see me May 2020    Sent message to [facility] to see if we can medication shipped expeditiously to her as she is out.     [email or web address]_[email or web address]_[email or web address]    I have reviewed the note as documented above.  This accurately captures the substance of my conversation with the patient.  Patient contact time  15  minutes. Over 50% of this visit was spent in patient care and care coordination.   Time of visit is 1250pm - 110pm    Viraj Ojeda MD      ------------------------------------------------------------------------------------------------------------------------------------------------------------------------    Video-Visit Details    The patient has been notified of following:     \"After a review of the patient s situation, this visit was changed from an in-person visit to a video visit to reduce the risk of COVID-19 exposure.   The patient is being evaluated via a billable video visit.\"    \"This video visit will be conducted via a call between you and your physician/provider. We have found that certain health care needs " "can be provided without the need for an in-person physical exam.  This service lets us provide the care you need with a video conversation.  If a prescription is necessary we can send it directly to your pharmacy.  If lab work is needed we can place an order for that and you can then stop by our lab to have the test done at a later time.    If during the course of the call the physician/provider feels a video visit is not appropriate, you will not be charged for this service.\"     Patient has given verbal consent for Video visit? Yes    Patient would like the video invitation sent by:     Type of service:  Video Visit    Video Start Time:     Video End Time (time video stopped):     Duration:  minutes - see above    Originating Location (pt. Location):     Distant Location (provider location):  [facility] NEUROLOGY     Mode of Communication:  Video Conference via Medication Review (and if not possible then doximity)      Viraj Ojeda MD      --------------------------------------------------------------------------------------------------------------    Torri Cuevas is a 75 year old adult who is being evaluated via a billable video visit.      Charts reviewed  Consult from  Images reviewed        I have reviewed and updated the patient's Past Medical History, Social History, Family History and Medication List.    ALLERGIES  Lactose    Lasts visit details if there was a last visit:         Medications                                                                                                                                                                                                              14 Review of systems  are negative except for   Patient Active Problem List   Diagnosis     Basal cell carcinoma of face     Excess body and facial hair     HTN (hypertension)     IGT (impaired glucose tolerance)     Major depression in full remission (H)     Malignant neoplasm of upper-outer quadrant of right breast in " female, estrogen receptor positive (H)     Mixed hyperlipidemia     Osteoporosis     Schizoaffective disorder (H)     BMI 22.0-22.9, adult     Drug-induced Parkinsonism (H)     Tardive dyskinesia        Allergies   Allergen Reactions     Lactose      intol     Past Surgical History:   Procedure Laterality Date     COLONOSCOPY       LUMPECTOMY BREAST Right 2019    radiation. localization and sentinel node biopsy dr bello sanford     TONSILLECTOMY       Past Medical History:   Diagnosis Date     Drug-induced Parkinsonism (H) 5/15/2020     Tardive dyskinesia 6/11/2020     Social History     Socioeconomic History     Marital status:      Spouse name: Not on file     Number of children: Not on file     Years of education: Not on file     Highest education level: Not on file   Occupational History     Not on file   Social Needs     Financial resource strain: Not on file     Food insecurity     Worry: Not on file     Inability: Not on file     Transportation needs     Medical: Not on file     Non-medical: Not on file   Tobacco Use     Smoking status: Never Smoker     Smokeless tobacco: Never Used   Substance and Sexual Activity     Alcohol use: Not Currently     Drug use: Not on file     Sexual activity: Not on file   Lifestyle     Physical activity     Days per week: Not on file     Minutes per session: Not on file     Stress: Not on file   Relationships     Social connections     Talks on phone: Not on file     Gets together: Not on file     Attends [employer] service: Not on file     Active member of club or organization: Not on file     Attends meetings of clubs or organizations: Not on file     Relationship status: Not on file     Intimate partner violence     Fear of current or ex partner: Not on file     Emotionally abused: Not on file     Physically abused: Not on file     Forced sexual activity: Not on file   Other Topics Concern     Not on file   Social History Narrative     Not on file     Family History    Problem Relation Age of Onset     Breast Cancer Mother      Glaucoma Mother      Cardiovascular Father      Stomach Cancer Maternal Grandmother      Musculoskeletal Disorder Daughter         spinal stnosis     Psoriasis Daughter      Other - See Comments Daughter         no one problems     Alcoholism Daughter      Current Outpatient Medications   Medication Sig Dispense Refill     calcium citrate-vitamin D (CALCIUM CITRATE+D3) 315-250 MG-UNIT TABS per tablet Take 2 tablets by mouth daily        loperamide (IMODIUM A-D) 2 MG tablet Take 1 tablet (2 mg) by mouth 4 times daily as needed for diarrhea       losartan (COZAAR) 25 MG tablet Take 25 mg by mouth daily       Multiple Vitamins-Minerals (MULTIVITAMIN PO) Take 1 tablet by mouth daily (Equate brand)       sertraline (ZOLOFT) 100 MG tablet Take 1 tablet (100 mg) by mouth At Bedtime       tamoxifen (NOLVADEX) 20 MG tablet Take 1 tablet (20 mg) by mouth daily       valbenazine (INGREZZA) 40 MG capsule Take 1 capsule (40 mg) by mouth daily 30 capsule 11     VENTOLIN  (90 Base) MCG/ACT inhaler INHALE 1 TO 2 PUFFS BY MOUTH THREE TIMES DAILY AS NEEDED FOR DIFFICULTY BREATHING       zoledronic acid (ZOMETA) 4 MG/100ML SOLN Every 6 months       Again, thank you for allowing me to participate in the care of your patient.      Sincerely,    Viraj Ojeda MD

## 2021-01-03 ENCOUNTER — HEALTH MAINTENANCE LETTER (OUTPATIENT)
Age: 76
End: 2021-01-03

## 2021-04-25 ENCOUNTER — HEALTH MAINTENANCE LETTER (OUTPATIENT)
Age: 76
End: 2021-04-25

## 2021-07-26 DIAGNOSIS — G24.01 TARDIVE DYSKINESIA: ICD-10-CM

## 2021-07-27 RX ORDER — VALBENAZINE 40 MG/1
80 CAPSULE ORAL DAILY
Qty: 60 CAPSULE | Refills: 11 | OUTPATIENT
Start: 2021-07-27

## 2021-07-27 NOTE — TELEPHONE ENCOUNTER
Rx Authorization:  Requested Medication/ Dose Ingrezza 40mg capsule - small Caps 40 Wafer  Date last refill ordered: 8/11/20  Quantity ordered: 60  # refills: 11  Date of last clinic visit with ordering provider: 8/11/20  Date of next clinic visit with ordering provider:   All pertinent protocol data (lab date/result):   Include pertinent information from patients message:

## 2021-08-02 DIAGNOSIS — G24.01 TARDIVE DYSKINESIA: ICD-10-CM

## 2021-08-02 RX ORDER — VALBENAZINE 40 MG/1
80 CAPSULE ORAL DAILY
Qty: 60 CAPSULE | Refills: 2 | Status: SHIPPED | OUTPATIENT
Start: 2021-08-02

## 2021-09-09 RX ORDER — TRAMADOL HYDROCHLORIDE 50 MG/1
TABLET ORAL
COMMUNITY
Start: 2021-02-22 | End: 2021-09-23

## 2021-09-09 RX ORDER — TRAZODONE HYDROCHLORIDE 50 MG/1
50 TABLET, FILM COATED ORAL AT BEDTIME
COMMUNITY
Start: 2021-07-21 | End: 2021-09-23

## 2021-09-09 RX ORDER — SERTRALINE HYDROCHLORIDE 100 MG/1
100 TABLET, FILM COATED ORAL AT BEDTIME
Qty: 180 TABLET | Refills: 3 | COMMUNITY
Start: 2021-09-09

## 2021-09-09 NOTE — PROGRESS NOTES
Diagnosis/Summary/Recommendations:    PATIENT: Torri Cuevas  76 year old adult     : 1945    YESSICA: 2021    [facility] [address]  [phone] (M)  [email or web address]  [email or web address]     Kevin Cuevas  Same number     Psychiatry - not seeing      Dr. Negro Conde is her pcp       Dr. Amol Tran [place] 3/2020 visit      Sertraline was started for mood and her mood is better.     Oncology    Jessica Steven, APRN, CNP    [address] [place] [address]    [phone] (Fax)      Assessment:    (G24.01) Tardive dyskinesia  (primary encounter diagnosis)      AIMS score from may 22, 2020     [email or web address]     Facial and Oral movements.   1. Muscle of facial expression   1->1->0(impassive)  2. Lips and perioral area 2->1 or 2 ->0  3. Jaw 2 ->0 ->0  4. Tongue 2 ->1->1     Extremity movements  5. Upper 1->0->0  6. Lower 1->0->0     Trunk  Neck/shoulder/hips 0->0     Global judgments  8. Severity of movements 2->1->1  9. Incapacitation due to abnormal movements 1->0->0  10. Patient awareness of abnormal movements 2->1->0     Dental Status  11. Current problems with teeth and/or dentures  0=no;   12. Does patient usually wear dentures  0=no  13. Not edentia = 0   14. Movements are not present at night = 0      AIMS Score 14->5->2 (today)    Would like to go off the ingrezza as not sure as to the degree it is helping       Gait/Balance/Falls   Last fall was about 7-10 days ago - her leg collapsed under her and was in the yard. Did not fall.  grabbed her. It was her left leg. She was weak for about 10 minutes. There may have numbness    She holds her right hand when walking.  reports some shuffling    She does veer with walking. Denies vertigo, fullness or loss of hearing. No history of menieres    Has occasional tremor - thinks her tremor is better    Orofacial movements are reportedly better  "    She had a bad fall in [place] feb 22,2021 - had sutures. Leg collapsed. Weak    Had a fall prior to that    Brain MRI scan in [place] in 1/22/2020    Exercise/Therapy   Not able to walk very far - worried about falling and \"bears are in the area\" as well.  Starts out walking and is weak.     Cognitive/Driving   No driving.     Mood   Sertraline zoloft 100mg at night   Depressed because of her physical ailments.  Has seen a psychiatrist who prescribed \"sleeping pills\"  Giovanny Baires   [email or web address]    Hallucinations/delusions   Denies     Sleep   Trazodone desyrel 100mg at night    Bladder   Not taking any medications  She has some incontinence  She wears a pad  Has some urinary urgency and frequency  Nocturia - 1/noc     GI/Constipation/GERD   No constipation  Loperamide/imodium - as needed  Rare diarrhea  No heart burn    ENDO   Calcium 2/day  No thyroid medication  No diabetes    Cardio/heart   Losartan cozaar 25mg   136/79 blood pressure today    Vision   Wears glasses  No problems with her eyes    Heme   No taking aspirin    Pain in her hands and feet - when she is trying to push down or squeeze something   Has not seen a hand therapist - ie occupational therapist     radha nolvadex   Cancer diagnosis august 2018        Medications                 Calcium citrate vitamin D 315-250 2           Loperamide imodium 2mg  4/day as needed           Losartan cozaar 25mg  1           MVI 1           Sertaline zoloft 100mg     1       Tamoxifen novadex 20mg  1           Tramadol ultram 50mg        Trazodone desyrel 100mg    1     Valbenazine ingrezza 40mg  1->2           Ventolin  (90 base) mcg/act inhaler As needed           Vitamin d3 2000 international unit(s)  Not taking           Zoledronic acid zometa Every 6 months                                Plan:    Will be going to [place] 27 October 2021 and will be there 6 months.   40mg x 2 presently. " Encouraged her to go 40mg daily for 2-4 weeks and then decide if she wants to go off this.    She will need to be monitored in terms of her gait, balance and involuntary movements    Return in spring 2022.       Coding statement:   Medical Decision Making:  #  Chronic progressive medical conditions addressed  Yes - mood and movement  Review and/or interpretation of unique test or documentation from a provider outside of neurology no   Independent historian provided additional details  yes I  Prescription drug management and review of potential side effects and/or monitoring for side effects  yes   Health impacted by social determinants of health  No     I have reviewed the note as documented above.  This accurately captures the substance of my conversation with the patient and total time spent preparing for visit, executing visit and completing visit on the day of the visit:  30 minutes.     Time of visit: 215 - 247pm     Viraj Ojeda MD     ______________________________________    Last visit date and details:             ______________________________________      Patient was asked about 14 Review of systems including changes in vision (dry eyes, double vision), hearing, heart, lungs, musculoskeletal, depression, anxiety, snoring, RBD, insomnia, urinary frequency, urinary urgency, constipation, swallowing problems, hematological, ID, allergies, skin problems: seborrhea, endocrinological: thyroid, diabetes, cholesterol; balance, weight changes, and other neurological problems and these were not significant at this time except for   Patient Active Problem List   Diagnosis     Basal cell carcinoma of face     Excess body and facial hair     HTN (hypertension)     IGT (impaired glucose tolerance)     Major depression in full remission (H)     Malignant neoplasm of upper-outer quadrant of right breast in female, estrogen receptor positive (H)     Mixed hyperlipidemia     Osteoporosis     Schizoaffective disorder (H)      BMI 22.0-22.9, adult     Drug-induced Parkinsonism (H)     Tardive dyskinesia          Allergies   Allergen Reactions     Lactose      intol     Past Surgical History:   Procedure Laterality Date     COLONOSCOPY       LUMPECTOMY BREAST Right 2019    radiation. localization and sentinel node biopsy dr bello sanford     TONSILLECTOMY       Past Medical History:   Diagnosis Date     Drug-induced Parkinsonism (H) 5/15/2020     Tardive dyskinesia 6/11/2020     Social History     Socioeconomic History     Marital status:      Spouse name: Not on file     Number of children: Not on file     Years of education: Not on file     Highest education level: Not on file   Occupational History     Not on file   Tobacco Use     Smoking status: Never Smoker     Smokeless tobacco: Never Used   Substance and Sexual Activity     Alcohol use: Not Currently     Drug use: Not on file     Sexual activity: Not on file   Other Topics Concern     Not on file   Social History Narrative     Not on file     Social Determinants of Health     Financial Resource Strain:      Difficulty of Paying Living Expenses:    Food Insecurity:      Worried About Running Out of Food in the Last Year:      Ran Out of Food in the Last Year:    Transportation Needs:      Lack of Transportation (Medical):      Lack of Transportation (Non-Medical):    Physical Activity:      Days of Exercise per Week:      Minutes of Exercise per Session:    Stress:      Feeling of Stress :    Social Connections:      Frequency of Communication with Friends and Family:      Frequency of Social Gatherings with Friends and Family:      Attends Rastafari Services:      Active Member of Clubs or Organizations:      Attends Club or Organization Meetings:      Marital Status:    Intimate Partner Violence:      Fear of Current or Ex-Partner:      Emotionally Abused:      Physically Abused:      Sexually Abused:        Drug and lactation database from the [facility] National  Library of Medicine:  http://toxnet.nlm.nih.gov/cgi-bin/sis/htmlgen?LACT      B/P: Data Unavailable, T: Data Unavailable, P: Data Unavailable, R: Data Unavailable 0 lbs 0 oz  There were no vitals taken for this visit., There is no height or weight on file to calculate BMI.  Medications and Vitals not listed above were documented in the cart and reviewed by me.     Current Outpatient Medications   Medication Sig Dispense Refill     calcium citrate-vitamin D (CALCIUM CITRATE+D3) 315-250 MG-UNIT TABS per tablet Take 2 tablets by mouth daily        INGREZZA 40 MG capsule Take 2 capsules (80 mg) by mouth daily 60 capsule 2     loperamide (IMODIUM A-D) 2 MG tablet Take 1 tablet (2 mg) by mouth 4 times daily as needed for diarrhea       losartan (COZAAR) 25 MG tablet Take 25 mg by mouth daily       Multiple Vitamins-Minerals (MULTIVITAMIN PO) Take 1 tablet by mouth daily (Equate brand)       sertraline (ZOLOFT) 100 MG tablet Take 1 tablet (100 mg) by mouth At Bedtime       tamoxifen (NOLVADEX) 20 MG tablet Take 1 tablet (20 mg) by mouth daily       traMADol (ULTRAM) 50 MG tablet TAKE 1 TABLET BY MOUTH EVERY 12 HOURS AS NEEDED FOR PAIN       traZODone (DESYREL) 50 MG tablet Take 50 mg by mouth At Bedtime       VENTOLIN  (90 Base) MCG/ACT inhaler INHALE 1 TO 2 PUFFS BY MOUTH THREE TIMES DAILY AS NEEDED FOR DIFFICULTY BREATHING       zoledronic acid (ZOMETA) 4 MG/100ML SOLN Every 6 months           Medications                                                                                                                                                  Viraj Ojeda MD

## 2021-09-23 ENCOUNTER — OFFICE VISIT (OUTPATIENT)
Dept: NEUROLOGY | Facility: CLINIC | Age: 76
End: 2021-09-23
Payer: MEDICARE

## 2021-09-23 VITALS
RESPIRATION RATE: 16 BRPM | WEIGHT: 162.8 LBS | DIASTOLIC BLOOD PRESSURE: 79 MMHG | OXYGEN SATURATION: 98 % | SYSTOLIC BLOOD PRESSURE: 136 MMHG | HEART RATE: 57 BPM

## 2021-09-23 DIAGNOSIS — G24.01 TARDIVE DYSKINESIA: Primary | ICD-10-CM

## 2021-09-23 PROBLEM — F41.1 GENERALIZED ANXIETY DISORDER: Status: ACTIVE | Noted: 2021-07-21

## 2021-09-23 PROBLEM — R53.1 WEAKNESS GENERALIZED: Status: ACTIVE | Noted: 2021-06-08

## 2021-09-23 PROBLEM — R26.89 BALANCE DISORDER: Status: ACTIVE | Noted: 2021-06-08

## 2021-09-23 PROBLEM — Z74.09 DECREASED FUNCTIONAL MOBILITY AND ENDURANCE: Status: ACTIVE | Noted: 2021-06-08

## 2021-09-23 PROCEDURE — 99214 OFFICE O/P EST MOD 30 MIN: CPT | Performed by: PSYCHIATRY & NEUROLOGY

## 2021-09-23 RX ORDER — TRAZODONE HYDROCHLORIDE 100 MG/1
100 TABLET ORAL AT BEDTIME
COMMUNITY
Start: 2021-08-18

## 2021-09-23 RX ORDER — AMLODIPINE BESYLATE 5 MG/1
5 TABLET ORAL
COMMUNITY
Start: 2021-01-04

## 2021-09-23 ASSESSMENT — PAIN SCALES - GENERAL: PAINLEVEL: NO PAIN (0)

## 2021-09-23 NOTE — PATIENT INSTRUCTIONS
"(G24.01) Tardive dyskinesia  (primary encounter diagnosis)      AIMS score from may 22, 2020     [email or web address]     Facial and Oral movements.   1. Muscle of facial expression   1->1->0(impassive)  2. Lips and perioral area 2->1 or 2 ->0  3. Jaw 2 ->0 ->0  4. Tongue 2 ->1->1     Extremity movements  5. Upper 1->0->0  6. Lower 1->0->0     Trunk  Neck/shoulder/hips 0->0     Global judgments  8. Severity of movements 2->1->1  9. Incapacitation due to abnormal movements 1->0->0  10. Patient awareness of abnormal movements 2->1->0     Dental Status  11. Current problems with teeth and/or dentures  0=no;   12. Does patient usually wear dentures  0=no  13. Not edentia = 0   14. Movements are not present at night = 0      AIMS Score 14->5->2 (today)    Would like to go off the ingrezza as not sure as to the degree it is helping       Gait/Balance/Falls   Last fall was about 7-10 days ago - her leg collapsed under her and was in the yard. Did not fall.  grabbed her. It was her left leg. She was weak for about 10 minutes. There may have numbness    She holds her right hand when walking.  reports some shuffling    She does veer with walking. Denies vertigo, fullness or loss of hearing. No history of menieres    Has occasional tremor - thinks her tremor is better    Orofacial movements are reportedly better     She had a bad fall in [place] feb 22,2021 - had sutures. Leg collapsed. Weak    Had a fall prior to that    Brain MRI scan in [place] in 1/22/2020    Exercise/Therapy   Not able to walk very far - worried about falling and \"bears are in the area\" as well.  Starts out walking and is weak.     Cognitive/Driving   No driving.     Mood   Sertraline zoloft 100mg at night   Depressed because of her physical ailments.  Has seen a psychiatrist who prescribed \"sleeping pills\"  Giovanny Baires   [email or web address]    Hallucinations/delusions "   Denies     Sleep   Trazodone desyrel 100mg at night    Bladder   Not taking any medications  She has some incontinence  She wears a pad  Has some urinary urgency and frequency  Nocturia - 1/noc     GI/Constipation/GERD   No constipation  Loperamide/imodium - as needed  Rare diarrhea  No heart burn    ENDO   Calcium 2/day  No thyroid medication  No diabetes    Cardio/heart   Losartan cozaar 25mg   136/79 blood pressure today    Vision   Wears glasses  No problems with her eyes    Heme   No taking aspirin    Pain in her hands and feet - when she is trying to push down or squeeze something   Has not seen a hand therapist - ie occupational therapist     tomoxifen nolvadex   Cancer diagnosis august 2018        Medications                 Calcium citrate vitamin D 315-250 2           Loperamide imodium 2mg  4/day as needed           Losartan cozaar 25mg  1           MVI 1           Sertaline zoloft 100mg     1       Tamoxifen novadex 20mg  1           Tramadol ultram 50mg        Trazodone desyrel 100mg    1     Valbenazine ingrezza 40mg  1->2           Ventolin  (90 base) mcg/act inhaler As needed           Vitamin d3 2000 international unit(s)  Not taking           Zoledronic acid zometa Every 6 months                                Plan:    Will be going to [place] 27 October 2021 and will be there 6 months.   40mg x 2 presently. Encouraged her to go 40mg daily for 2-4 weeks and then decide if she wants to go off this.    She will need to be monitored in terms of her gait, balance and involuntary movements    Return in spring 2022.

## 2021-09-23 NOTE — NURSING NOTE
Chief Complaint   Patient presents with     RECHECK     UMP RETURN MOVEMENT DISORDER - annual f/u [facility]     Trent Gage

## 2021-09-23 NOTE — LETTER
2021       RE: Torri Cuevas  [address]  [facility] [address]     Dear Colleague,    Thank you for referring your patient, Torri Cuevas, to the [facility] [place] at [facility]. Please see a copy of my visit note below.        Diagnosis/Summary/Recommendations:    PATIENT: Torri Cuevas  76 year old adult     : 1945    YESSICA: 2021    [facility] [address]  [phone] (M)  [email or web address]  [email or web address]  [email or web address]     [email or web address]     Kevin Cuevas  Same number     Psychiatry - not seeing      Dr. Negro Conde is her pcp       Dr. Amol Tran [place] 3/2020 visit      Sertraline was started for mood and her mood is better.     Oncology    Jessica Steven, APRN, CNP    [address] [place] [address]    [phone] (Fax)      Assessment:    (G24.01) Tardive dyskinesia  (primary encounter diagnosis)      AIMS score from may 22, 2020     [email or web address]     Facial and Oral movements.   1. Muscle of facial expression   1->1->0(impassive)  2. Lips and perioral area 2->1 or 2 ->0  3. Jaw 2 ->0 ->0  4. Tongue 2 ->1->1     Extremity movements  5. Upper 1->0->0  6. Lower 1->0->0     Trunk  Neck/shoulder/hips 0->0     Global judgments  8. Severity of movements 2->1->1  9. Incapacitation due to abnormal movements 1->0->0  10. Patient awareness of abnormal movements 2->1->0     Dental Status  11. Current problems with teeth and/or dentures  0=no;   12. Does patient usually wear dentures  0=no  13. Not edentia = 0   14. Movements are not present at night = 0      AIMS Score 14->5->2 (today)    Would like to go off the ingrezza as not sure as to the degree it is helping       Gait/Balance/Falls   Last fall was about 7-10 days ago - her leg collapsed under her and was in the yard. Did not fall.  grabbed her. It was her left leg. She was weak for  "about 10 minutes. There may have numbness    She holds her right hand when walking.  reports some shuffling    She does veer with walking. Denies vertigo, fullness or loss of hearing. No history of menieres    Has occasional tremor - thinks her tremor is better    Orofacial movements are reportedly better     She had a bad fall in [place] feb 22,2021 - had sutures. Leg collapsed. Weak    Had a fall prior to that    Brain MRI scan in [place] in 1/22/2020    Exercise/Therapy   Not able to walk very far - worried about falling and \"bears are in the area\" as well.  Starts out walking and is weak.     Cognitive/Driving   No driving.     Mood   Sertraline zoloft 100mg at night   Depressed because of her physical ailments.  Has seen a psychiatrist who prescribed \"sleeping pills\"  Giovanny Baires   [email or web address]    Hallucinations/delusions   Denies     Sleep   Trazodone desyrel 100mg at night    Bladder   Not taking any medications  She has some incontinence  She wears a pad  Has some urinary urgency and frequency  Nocturia - 1/noc     GI/Constipation/GERD   No constipation  Loperamide/imodium - as needed  Rare diarrhea  No heart burn    ENDO   Calcium 2/day  No thyroid medication  No diabetes    Cardio/heart   Losartan cozaar 25mg   136/79 blood pressure today    Vision   Wears glasses  No problems with her eyes    Heme   No taking aspirin    Pain in her hands and feet - when she is trying to push down or squeeze something   Has not seen a hand therapist - ie occupational therapist     tomoxifen nolvadex   Cancer diagnosis august 2018        Medications                 Calcium citrate vitamin D 315-250 2           Loperamide imodium 2mg  4/day as needed           Losartan cozaar 25mg  1           MVI 1           Sertaline zoloft 100mg     1       Tamoxifen novadex 20mg  1           Tramadol ultram 50mg        Trazodone desyrel 100mg    1     Valbenazine ingrezza " 40mg  1->2           Ventolin  (90 base) mcg/act inhaler As needed           Vitamin d3 2000 international unit(s)  Not taking           Zoledronic acid zometa Every 6 months                                Plan:    Will be going to [place] 27 October 2021 and will be there 6 months.   40mg x 2 presently. Encouraged her to go 40mg daily for 2-4 weeks and then decide if she wants to go off this.    She will need to be monitored in terms of her gait, balance and involuntary movements    Return in spring 2022.       Coding statement:   Medical Decision Making:  #  Chronic progressive medical conditions addressed  Yes - mood and movement  Review and/or interpretation of unique test or documentation from a provider outside of neurology no   Independent historian provided additional details  yes I  Prescription drug management and review of potential side effects and/or monitoring for side effects  yes   Health impacted by social determinants of health  No     I have reviewed the note as documented above.  This accurately captures the substance of my conversation with the patient and total time spent preparing for visit, executing visit and completing visit on the day of the visit:  30 minutes.     Time of visit: 215 - 247pm     Viraj Ojeda MD     ______________________________________    Last visit date and details:             ______________________________________      Patient was asked about 14 Review of systems including changes in vision (dry eyes, double vision), hearing, heart, lungs, musculoskeletal, depression, anxiety, snoring, RBD, insomnia, urinary frequency, urinary urgency, constipation, swallowing problems, hematological, ID, allergies, skin problems: seborrhea, endocrinological: thyroid, diabetes, cholesterol; balance, weight changes, and other neurological problems and these were not significant at this time except for   Patient Active Problem List   Diagnosis     Basal cell carcinoma of face      Excess body and facial hair     HTN (hypertension)     IGT (impaired glucose tolerance)     Major depression in full remission (H)     Malignant neoplasm of upper-outer quadrant of right breast in female, estrogen receptor positive (H)     Mixed hyperlipidemia     Osteoporosis     Schizoaffective disorder (H)     BMI 22.0-22.9, adult     Drug-induced Parkinsonism (H)     Tardive dyskinesia          Allergies   Allergen Reactions     Lactose      intol     Past Surgical History:   Procedure Laterality Date     COLONOSCOPY       LUMPECTOMY BREAST Right 2019    radiation. localization and sentinel node biopsy dr bello sanford     TONSILLECTOMY       Past Medical History:   Diagnosis Date     Drug-induced Parkinsonism (H) 5/15/2020     Tardive dyskinesia 6/11/2020     Social History     Socioeconomic History     Marital status:      Spouse name: Not on file     Number of children: Not on file     Years of education: Not on file     Highest education level: Not on file   Occupational History     Not on file   Tobacco Use     Smoking status: Never Smoker     Smokeless tobacco: Never Used   Substance and Sexual Activity     Alcohol use: Not Currently     Drug use: Not on file     Sexual activity: Not on file   Other Topics Concern     Not on file   Social History Narrative     Not on file     Social Determinants of Health     Financial Resource Strain:      Difficulty of Paying Living Expenses:    Food Insecurity:      Worried About Running Out of Food in the Last Year:      Ran Out of Food in the Last Year:    Transportation Needs:      Lack of Transportation (Medical):      Lack of Transportation (Non-Medical):    Physical Activity:      Days of Exercise per Week:      Minutes of Exercise per Session:    Stress:      Feeling of Stress :    Social Connections:      Frequency of Communication with Friends and Family:      Frequency of Social Gatherings with Friends and Family:      Attends Moravian Services:       Active Member of Clubs or Organizations:      Attends Club or Organization Meetings:      Marital Status:    Intimate Partner Violence:      Fear of Current or Ex-Partner:      Emotionally Abused:      Physically Abused:      Sexually Abused:        Drug and lactation database from the [place] National Library of Medicine:  http://toxnet.nlm.nih.gov/cgi-bin/sis/htmlgen?LACT      B/P: Data Unavailable, T: Data Unavailable, P: Data Unavailable, R: Data Unavailable 0 lbs 0 oz  There were no vitals taken for this visit., There is no height or weight on file to calculate BMI.  Medications and Vitals not listed above were documented in the cart and reviewed by me.     Current Outpatient Medications   Medication Sig Dispense Refill     calcium citrate-vitamin D (CALCIUM CITRATE+D3) 315-250 MG-UNIT TABS per tablet Take 2 tablets by mouth daily        INGREZZA 40 MG capsule Take 2 capsules (80 mg) by mouth daily 60 capsule 2     loperamide (IMODIUM A-D) 2 MG tablet Take 1 tablet (2 mg) by mouth 4 times daily as needed for diarrhea       losartan (COZAAR) 25 MG tablet Take 25 mg by mouth daily       Multiple Vitamins-Minerals (MULTIVITAMIN PO) Take 1 tablet by mouth daily (Equate brand)       sertraline (ZOLOFT) 100 MG tablet Take 1 tablet (100 mg) by mouth At Bedtime       tamoxifen (NOLVADEX) 20 MG tablet Take 1 tablet (20 mg) by mouth daily       traMADol (ULTRAM) 50 MG tablet TAKE 1 TABLET BY MOUTH EVERY 12 HOURS AS NEEDED FOR PAIN       traZODone (DESYREL) 50 MG tablet Take 50 mg by mouth At Bedtime       VENTOLIN  (90 Base) MCG/ACT inhaler INHALE 1 TO 2 PUFFS BY MOUTH THREE TIMES DAILY AS NEEDED FOR DIFFICULTY BREATHING       zoledronic acid (ZOMETA) 4 MG/100ML SOLN Every 6 months           Medications                                                                                                                                                  Viraj Ojeda MD

## 2022-05-21 ENCOUNTER — HEALTH MAINTENANCE LETTER (OUTPATIENT)
Age: 77
End: 2022-05-21

## 2022-09-18 ENCOUNTER — HEALTH MAINTENANCE LETTER (OUTPATIENT)
Age: 77
End: 2022-09-18

## 2023-06-04 ENCOUNTER — HEALTH MAINTENANCE LETTER (OUTPATIENT)
Age: 78
End: 2023-06-04

## 2024-02-25 ENCOUNTER — HEALTH MAINTENANCE LETTER (OUTPATIENT)
Age: 79
End: 2024-02-25

## 2024-07-14 ENCOUNTER — HEALTH MAINTENANCE LETTER (OUTPATIENT)
Age: 79
End: 2024-07-14